# Patient Record
Sex: MALE | Race: WHITE | Employment: OTHER | URBAN - METROPOLITAN AREA
[De-identification: names, ages, dates, MRNs, and addresses within clinical notes are randomized per-mention and may not be internally consistent; named-entity substitution may affect disease eponyms.]

---

## 2017-07-17 PROBLEM — N52.9 ERECTILE DYSFUNCTION: Status: ACTIVE | Noted: 2017-07-17

## 2017-08-06 ENCOUNTER — HOSPITAL ENCOUNTER (EMERGENCY)
Age: 67
Discharge: HOME | End: 2017-08-07
Attending: EMERGENCY MEDICINE
Payer: COMMERCIAL

## 2017-08-06 ENCOUNTER — APPOINTMENT (OUTPATIENT)
Dept: GENERAL RADIOLOGY | Age: 67
End: 2017-08-06
Payer: COMMERCIAL

## 2017-08-06 VITALS
SYSTOLIC BLOOD PRESSURE: 151 MMHG | TEMPERATURE: 97.9 F | BODY MASS INDEX: 30.78 KG/M2 | RESPIRATION RATE: 16 BRPM | DIASTOLIC BLOOD PRESSURE: 84 MMHG | OXYGEN SATURATION: 96 % | WEIGHT: 215 LBS | HEART RATE: 66 BPM | HEIGHT: 70 IN

## 2017-08-06 DIAGNOSIS — S80.211A KNEE ABRASION, RIGHT, INITIAL ENCOUNTER: ICD-10-CM

## 2017-08-06 DIAGNOSIS — S40.021A CONTUSION, SHOULDER AND UPPER ARM, MULTIPLE SITES, RIGHT, INITIAL ENCOUNTER: Primary | ICD-10-CM

## 2017-08-06 DIAGNOSIS — S40.011A CONTUSION, SHOULDER AND UPPER ARM, MULTIPLE SITES, RIGHT, INITIAL ENCOUNTER: Primary | ICD-10-CM

## 2017-08-06 PROCEDURE — 73060 X-RAY EXAM OF HUMERUS: CPT

## 2017-08-06 PROCEDURE — 99283 EMERGENCY DEPT VISIT LOW MDM: CPT

## 2017-08-06 PROCEDURE — 73030 X-RAY EXAM OF SHOULDER: CPT

## 2017-08-06 ASSESSMENT — PAIN DESCRIPTION - DESCRIPTORS: DESCRIPTORS: SHARP

## 2017-08-06 ASSESSMENT — PAIN DESCRIPTION - PAIN TYPE: TYPE: ACUTE PAIN

## 2017-08-06 ASSESSMENT — PAIN DESCRIPTION - ORIENTATION: ORIENTATION: RIGHT

## 2017-08-06 ASSESSMENT — PAIN DESCRIPTION - LOCATION: LOCATION: ARM

## 2017-08-06 ASSESSMENT — PAIN SCALES - GENERAL: PAINLEVEL_OUTOF10: 7

## 2017-08-07 PROCEDURE — 90715 TDAP VACCINE 7 YRS/> IM: CPT | Performed by: EMERGENCY MEDICINE

## 2017-08-07 PROCEDURE — 6370000000 HC RX 637 (ALT 250 FOR IP): Performed by: EMERGENCY MEDICINE

## 2017-08-07 PROCEDURE — 6360000002 HC RX W HCPCS: Performed by: EMERGENCY MEDICINE

## 2017-08-07 PROCEDURE — 90471 IMMUNIZATION ADMIN: CPT | Performed by: EMERGENCY MEDICINE

## 2017-08-07 RX ORDER — ACETAMINOPHEN 325 MG/1
650 TABLET ORAL ONCE
Status: COMPLETED | OUTPATIENT
Start: 2017-08-07 | End: 2017-08-07

## 2017-08-07 RX ADMIN — ACETAMINOPHEN 650 MG: 325 TABLET ORAL at 00:46

## 2017-08-07 RX ADMIN — TETANUS TOXOID, REDUCED DIPHTHERIA TOXOID AND ACELLULAR PERTUSSIS VACCINE, ADSORBED 0.5 ML: 5; 2.5; 8; 8; 2.5 SUSPENSION INTRAMUSCULAR at 00:09

## 2017-08-07 ASSESSMENT — ENCOUNTER SYMPTOMS
NAUSEA: 0
SHORTNESS OF BREATH: 0
VOMITING: 0
SORE THROAT: 0
DIARRHEA: 0
ABDOMINAL PAIN: 0

## 2018-01-16 PROBLEM — N18.30 CKD (CHRONIC KIDNEY DISEASE), STAGE III (HCC): Status: ACTIVE | Noted: 2018-01-16

## 2019-01-15 PROBLEM — E55.9 HYPOVITAMINOSIS D: Status: ACTIVE | Noted: 2019-01-15

## 2021-03-23 LAB
ABSOLUTE BASO #: 0.1 X10E9/L (ref 0–0.2)
ABSOLUTE EOS #: 0.2 X10E9/L (ref 0–0.4)
ABSOLUTE LYMPH #: 1.9 X10E9/L (ref 1–3.5)
ABSOLUTE MONO #: 0.7 X10E9/L (ref 0–0.9)
ABSOLUTE NEUT #: 4.7 X10E9/L (ref 1.5–6.6)
ANION GAP SERPL CALCULATED.3IONS-SCNC: 9 MMOL/L (ref 5–15)
BASOPHILS RELATIVE PERCENT: 1 %
BUN BLDV-MCNC: 27 MG/DL (ref 5–27)
CALCIUM SERPL-MCNC: 9.2 MG/DL (ref 8.5–10.5)
CHLORIDE BLD-SCNC: 104 MMOL/L (ref 98–109)
CO2: 28 MMOL/L (ref 22–32)
CREAT SERPL-MCNC: 1.62 MG/DL (ref 0.6–1.3)
CREATINE, URINE: 71.44 MG/DL
EGFR AFRICAN AMERICAN: 51 ML/MIN/1.73SQ.M
EGFR IF NONAFRICAN AMERICAN: 42 ML/MIN/1.73SQ.M
EOSINOPHILS RELATIVE PERCENT: 2.2 %
GLUCOSE: 107 MG/DL (ref 65–99)
HCT VFR BLD CALC: 40.1 % (ref 39–49)
HEMOGLOBIN: 13.8 G/DL (ref 13–17)
LYMPHOCYTE %: 24.9 %
MAGNESIUM: 1.8 MG/DL (ref 1.8–2.6)
MCH RBC QN AUTO: 29.9 PG (ref 27–34)
MCHC RBC AUTO-ENTMCNC: 34.3 G/DL (ref 32–36)
MCV RBC AUTO: 87 FL (ref 80–100)
MONOCYTES # BLD: 9.4 %
NEUTROPHILS RELATIVE PERCENT: 62.5 %
PARATHYROID HORMONE INTACT: 92 PG/ML (ref 12–88)
PDW BLD-RTO: 13.8 % (ref 11.5–15)
PHOSPHORUS: 3.5 MG/DL (ref 2.4–4.9)
PLATELETS: 264 X10E9/L (ref 150–450)
PMV BLD AUTO: 7.7 FL (ref 7–12)
POTASSIUM SERPL-SCNC: 4.8 MMOL/L (ref 3.5–5)
PROTEIN, URINE: 90 MG/L
RBC: 4.6 X10E12/L (ref 4.1–5.7)
SODIUM BLD-SCNC: 141 MMOL/L (ref 134–146)
VITAMIN D 25-HYDROXY: 90.4 NG/ML (ref 30–100)
WBC: 7.5 X10E9/L (ref 4–11)

## 2021-08-26 LAB
ABSOLUTE BASO #: 0.1 X10E9/L (ref 0–0.2)
ABSOLUTE EOS #: 0.2 X10E9/L (ref 0–0.4)
ABSOLUTE LYMPH #: 2 X10E9/L (ref 1–3.5)
ABSOLUTE MONO #: 0.8 X10E9/L (ref 0–0.9)
ABSOLUTE NEUT #: 5.3 X10E9/L (ref 1.5–6.6)
ANION GAP SERPL CALCULATED.3IONS-SCNC: 7 MMOL/L (ref 5–15)
APPEARANCE: CLEAR
BASOPHILS RELATIVE PERCENT: 1.1 %
BILIRUBIN: NEGATIVE
BUN BLDV-MCNC: 37 MG/DL (ref 5–27)
CALCIUM SERPL-MCNC: 9.6 MG/DL (ref 8.5–10.5)
CHLORIDE BLD-SCNC: 103 MMOL/L (ref 98–109)
CO2: 26 MMOL/L (ref 22–32)
COLOR: YELLOW
CREAT SERPL-MCNC: 1.99 MG/DL (ref 0.6–1.3)
CREATINE, URINE: 126.94 MG/DL
EGFR AFRICAN AMERICAN: 40 ML/MIN/1.73SQ.M
EGFR IF NONAFRICAN AMERICAN: 33 ML/MIN/1.73SQ.M
EOSINOPHILS RELATIVE PERCENT: 2.7 %
GLUCOSE BLD-MCNC: NEGATIVE MG/DL
GLUCOSE: 97 MG/DL (ref 65–99)
HCT VFR BLD CALC: 41.6 % (ref 39–49)
HEMOGLOBIN: 14.1 G/DL (ref 13–17)
KETONES, URINE: NEGATIVE MG/DL
LEUKOCYTE ESTERASE, URINE: NEGATIVE
LYMPHOCYTE %: 24 %
MAGNESIUM: 1.9 MG/DL (ref 1.8–2.6)
MCH RBC QN AUTO: 30.1 PG (ref 27–34)
MCHC RBC AUTO-ENTMCNC: 33.8 G/DL (ref 32–36)
MCV RBC AUTO: 89 FL (ref 80–100)
MONOCYTES # BLD: 9.8 %
NEUTROPHILS RELATIVE PERCENT: 62.4 %
NITRITE, URINE: NEGATIVE
OCCULT BLOOD,URINE: NEGATIVE
PARATHYROID HORMONE INTACT: 90 PG/ML (ref 12–88)
PDW BLD-RTO: 14.3 % (ref 11.5–15)
PH: 6 (ref 5–8.5)
PHOSPHORUS: 3.6 MG/DL (ref 2.4–4.9)
PLATELETS: 302 X10E9/L (ref 150–450)
PMV BLD AUTO: 7.3 FL (ref 7–12)
POTASSIUM SERPL-SCNC: 5 MMOL/L (ref 3.5–5)
PROTEIN, URINE: 80 MG/L
PROTEIN, URINE: NEGATIVE MG/DL
RBC: 4.68 X10E12/L (ref 4.1–5.7)
SODIUM BLD-SCNC: 136 MMOL/L (ref 134–146)
SP GRAVITY MISCELLANEOUS: 1.01 (ref 1–1.03)
UROBILINOGEN, URINE: <1.1 EU/DL
VITAMIN D 25-HYDROXY: 64.3 NG/ML (ref 30–100)
WBC: 8.4 X10E9/L (ref 4–11)

## 2021-11-25 ENCOUNTER — APPOINTMENT (OUTPATIENT)
Dept: CT IMAGING | Age: 71
End: 2021-11-25
Payer: MEDICARE

## 2021-11-25 ENCOUNTER — HOSPITAL ENCOUNTER (EMERGENCY)
Age: 71
Discharge: HOME | End: 2021-11-25
Attending: EMERGENCY MEDICINE
Payer: MEDICARE

## 2021-11-25 VITALS
OXYGEN SATURATION: 95 % | RESPIRATION RATE: 17 BRPM | TEMPERATURE: 97.5 F | WEIGHT: 215 LBS | DIASTOLIC BLOOD PRESSURE: 107 MMHG | HEIGHT: 70 IN | HEART RATE: 87 BPM | SYSTOLIC BLOOD PRESSURE: 168 MMHG | BODY MASS INDEX: 30.78 KG/M2

## 2021-11-25 DIAGNOSIS — R11.0 NAUSEA: ICD-10-CM

## 2021-11-25 DIAGNOSIS — R53.83 FATIGUE, UNSPECIFIED TYPE: Primary | ICD-10-CM

## 2021-11-25 LAB
ABSOLUTE EOS #: 0.1 K/UL (ref 0–0.4)
ABSOLUTE IMMATURE GRANULOCYTE: ABNORMAL K/UL (ref 0–0.3)
ABSOLUTE LYMPH #: 1.2 K/UL (ref 1–4.8)
ABSOLUTE MONO #: 0.7 K/UL (ref 0.1–1.2)
ALBUMIN SERPL-MCNC: 4.1 G/DL (ref 3.5–5.2)
ALBUMIN/GLOBULIN RATIO: 1.4 (ref 1–2.5)
ALP BLD-CCNC: 76 U/L (ref 40–129)
ALT SERPL-CCNC: 23 U/L (ref 5–41)
ANION GAP SERPL CALCULATED.3IONS-SCNC: 11 MMOL/L (ref 9–17)
AST SERPL-CCNC: 20 U/L
BASOPHILS # BLD: 1 % (ref 0–2)
BASOPHILS ABSOLUTE: 0.1 K/UL (ref 0–0.2)
BILIRUB SERPL-MCNC: 0.64 MG/DL (ref 0.3–1.2)
BILIRUBIN URINE: NEGATIVE
BUN BLDV-MCNC: 16 MG/DL (ref 8–23)
BUN/CREAT BLD: ABNORMAL (ref 9–20)
CALCIUM SERPL-MCNC: 9.7 MG/DL (ref 8.6–10.4)
CHLORIDE BLD-SCNC: 98 MMOL/L (ref 98–107)
CO2: 26 MMOL/L (ref 20–31)
COLOR: YELLOW
COMMENT UA: ABNORMAL
CREAT SERPL-MCNC: 1.41 MG/DL (ref 0.7–1.2)
DIFFERENTIAL TYPE: ABNORMAL
EOSINOPHILS RELATIVE PERCENT: 1 % (ref 1–4)
GFR AFRICAN AMERICAN: >60 ML/MIN
GFR NON-AFRICAN AMERICAN: 50 ML/MIN
GFR SERPL CREATININE-BSD FRML MDRD: ABNORMAL ML/MIN/{1.73_M2}
GFR SERPL CREATININE-BSD FRML MDRD: ABNORMAL ML/MIN/{1.73_M2}
GLUCOSE BLD-MCNC: 108 MG/DL (ref 70–99)
GLUCOSE URINE: NEGATIVE
HCT VFR BLD CALC: 40.1 % (ref 41–53)
HEMOGLOBIN: 13.8 G/DL (ref 13.5–17.5)
IMMATURE GRANULOCYTES: ABNORMAL %
KETONES, URINE: ABNORMAL
LACTIC ACID: 1.1 MMOL/L (ref 0.5–2.2)
LEUKOCYTE ESTERASE, URINE: NEGATIVE
LIPASE: 48 U/L (ref 13–60)
LYMPHOCYTES # BLD: 14 % (ref 24–44)
MCH RBC QN AUTO: 30.7 PG (ref 26–34)
MCHC RBC AUTO-ENTMCNC: 34.5 G/DL (ref 31–37)
MCV RBC AUTO: 89.1 FL (ref 80–100)
MONOCYTES # BLD: 8 % (ref 2–11)
NITRITE, URINE: NEGATIVE
NRBC AUTOMATED: ABNORMAL PER 100 WBC
PDW BLD-RTO: 13.7 % (ref 12.5–15.4)
PH UA: 7.5 (ref 5–8)
PLATELET # BLD: 316 K/UL (ref 140–450)
PLATELET ESTIMATE: ABNORMAL
PMV BLD AUTO: 7 FL (ref 6–12)
POTASSIUM SERPL-SCNC: 4.4 MMOL/L (ref 3.7–5.3)
PROTEIN UA: NEGATIVE
RBC # BLD: 4.51 M/UL (ref 4.5–5.9)
RBC # BLD: ABNORMAL 10*6/UL
SEG NEUTROPHILS: 76 % (ref 36–66)
SEGMENTED NEUTROPHILS ABSOLUTE COUNT: 6.5 K/UL (ref 1.8–7.7)
SODIUM BLD-SCNC: 135 MMOL/L (ref 135–144)
SPECIFIC GRAVITY UA: 1.01 (ref 1–1.03)
TOTAL PROTEIN: 7.1 G/DL (ref 6.4–8.3)
TROPONIN INTERP: NORMAL
TROPONIN INTERP: NORMAL
TROPONIN T: NORMAL NG/ML
TROPONIN T: NORMAL NG/ML
TROPONIN, HIGH SENSITIVITY: 16 NG/L (ref 0–22)
TROPONIN, HIGH SENSITIVITY: 17 NG/L (ref 0–22)
TURBIDITY: CLEAR
URINE HGB: NEGATIVE
UROBILINOGEN, URINE: NORMAL
WBC # BLD: 8.6 K/UL (ref 3.5–11)
WBC # BLD: ABNORMAL 10*3/UL

## 2021-11-25 PROCEDURE — 6360000002 HC RX W HCPCS: Performed by: PHYSICIAN ASSISTANT

## 2021-11-25 PROCEDURE — 99285 EMERGENCY DEPT VISIT HI MDM: CPT

## 2021-11-25 PROCEDURE — 81003 URINALYSIS AUTO W/O SCOPE: CPT

## 2021-11-25 PROCEDURE — 85025 COMPLETE CBC W/AUTO DIFF WBC: CPT

## 2021-11-25 PROCEDURE — 83605 ASSAY OF LACTIC ACID: CPT

## 2021-11-25 PROCEDURE — 2580000003 HC RX 258: Performed by: EMERGENCY MEDICINE

## 2021-11-25 PROCEDURE — U0003 INFECTIOUS AGENT DETECTION BY NUCLEIC ACID (DNA OR RNA); SEVERE ACUTE RESPIRATORY SYNDROME CORONAVIRUS 2 (SARS-COV-2) (CORONAVIRUS DISEASE [COVID-19]), AMPLIFIED PROBE TECHNIQUE, MAKING USE OF HIGH THROUGHPUT TECHNOLOGIES AS DESCRIBED BY CMS-2020-01-R: HCPCS

## 2021-11-25 PROCEDURE — 36415 COLL VENOUS BLD VENIPUNCTURE: CPT

## 2021-11-25 PROCEDURE — 96374 THER/PROPH/DIAG INJ IV PUSH: CPT

## 2021-11-25 PROCEDURE — 2580000003 HC RX 258: Performed by: PHYSICIAN ASSISTANT

## 2021-11-25 PROCEDURE — 80053 COMPREHEN METABOLIC PANEL: CPT

## 2021-11-25 PROCEDURE — 96361 HYDRATE IV INFUSION ADD-ON: CPT

## 2021-11-25 PROCEDURE — 6360000004 HC RX CONTRAST MEDICATION: Performed by: EMERGENCY MEDICINE

## 2021-11-25 PROCEDURE — 83690 ASSAY OF LIPASE: CPT

## 2021-11-25 PROCEDURE — 74177 CT ABD & PELVIS W/CONTRAST: CPT

## 2021-11-25 PROCEDURE — 93005 ELECTROCARDIOGRAM TRACING: CPT | Performed by: PHYSICIAN ASSISTANT

## 2021-11-25 PROCEDURE — 84484 ASSAY OF TROPONIN QUANT: CPT

## 2021-11-25 PROCEDURE — U0005 INFEC AGEN DETEC AMPLI PROBE: HCPCS

## 2021-11-25 PROCEDURE — 6370000000 HC RX 637 (ALT 250 FOR IP): Performed by: PHYSICIAN ASSISTANT

## 2021-11-25 RX ORDER — SODIUM CHLORIDE 0.9 % (FLUSH) 0.9 %
10 SYRINGE (ML) INJECTION PRN
Status: DISCONTINUED | OUTPATIENT
Start: 2021-11-25 | End: 2021-11-25 | Stop reason: HOSPADM

## 2021-11-25 RX ORDER — ONDANSETRON 4 MG/1
4 TABLET, ORALLY DISINTEGRATING ORAL EVERY 8 HOURS PRN
Qty: 20 TABLET | Refills: 0 | Status: SHIPPED | OUTPATIENT
Start: 2021-11-25 | End: 2022-05-24 | Stop reason: ALTCHOICE

## 2021-11-25 RX ORDER — 0.9 % SODIUM CHLORIDE 0.9 %
80 INTRAVENOUS SOLUTION INTRAVENOUS ONCE
Status: COMPLETED | OUTPATIENT
Start: 2021-11-25 | End: 2021-11-25

## 2021-11-25 RX ORDER — 0.9 % SODIUM CHLORIDE 0.9 %
1000 INTRAVENOUS SOLUTION INTRAVENOUS ONCE
Status: COMPLETED | OUTPATIENT
Start: 2021-11-25 | End: 2021-11-25

## 2021-11-25 RX ORDER — ONDANSETRON 2 MG/ML
4 INJECTION INTRAMUSCULAR; INTRAVENOUS ONCE
Status: COMPLETED | OUTPATIENT
Start: 2021-11-25 | End: 2021-11-25

## 2021-11-25 RX ORDER — ONDANSETRON 4 MG/1
4 TABLET, ORALLY DISINTEGRATING ORAL ONCE
Status: COMPLETED | OUTPATIENT
Start: 2021-11-25 | End: 2021-11-25

## 2021-11-25 RX ADMIN — SODIUM CHLORIDE 80 ML: 9 INJECTION, SOLUTION INTRAVENOUS at 18:32

## 2021-11-25 RX ADMIN — SODIUM CHLORIDE, PRESERVATIVE FREE 10 ML: 5 INJECTION INTRAVENOUS at 18:33

## 2021-11-25 RX ADMIN — IOPAMIDOL 75 ML: 755 INJECTION, SOLUTION INTRAVENOUS at 18:32

## 2021-11-25 RX ADMIN — ONDANSETRON 4 MG: 4 TABLET, ORALLY DISINTEGRATING ORAL at 21:41

## 2021-11-25 RX ADMIN — ONDANSETRON 4 MG: 2 INJECTION INTRAMUSCULAR; INTRAVENOUS at 18:08

## 2021-11-25 RX ADMIN — SODIUM CHLORIDE 1000 ML: 9 INJECTION, SOLUTION INTRAVENOUS at 18:08

## 2021-11-25 NOTE — ED PROVIDER NOTES
[id number] [facility] ED  [address] [facility] [address]  Phone: [phone]  Fax: [phone]      Attending Physician [address]       Chief Complaint   Patient presents with    Nausea       DIAGNOSTIC RESULTS     LABS:  Labs Reviewed   CBC WITH AUTO DIFFERENTIAL - Abnormal; Notable for the following components:       Result Value    Hematocrit 40.1 (*)     Seg Neutrophils 76 (*)     Lymphocytes 14 (*)     All other components within normal limits   COMPREHENSIVE METABOLIC PANEL W/ REFLEX TO MG FOR LOW K - Abnormal; Notable for the following components:    Glucose 108 (*)     CREATININE 1.41 (*)     GFR Non- 50 (*)     All other components within normal limits   LIPASE   TROPONIN   LACTIC ACID   URINE RT REFLEX TO CULTURE       All other labs were within normal range or not returned as of this dictation. RADIOLOGY:  CT ABDOMEN PELVIS W IV CONTRAST Additional Contrast? None   Final Result   No acute inflammatory process or bowel obstruction. Colonic diverticulosis. Cholelithiasis. Postsurgical versus post posttraumatic changes right kidney noted with   surgical clips. Please correlate with surgical history. EMERGENCY DEPARTMENT COURSE:   Vitals:    Vitals:    11/25/21 1715   BP: (!) 168/107   Pulse: 87   Resp: 17   Temp: 97.5 °F (36.4 °C)   TempSrc: Oral   SpO2: 95%   Weight: 97.5 kg (215 lb)   Height: 5' 10\" (1.778 m)     -------------------------  BP: (!) 168/107, Temp: 97.5 °F (36.4 °C), Pulse: 87, Resp: 17      ED Course as of 11/25/21 1920   Thu Nov 25, 2021   1828 CBC is unremarkable. CMP demonstrates elevated creatinine at 1.41 which is improved from previous at 1.99 3 months ago, but is otherwise unremarkable. Lipase is normal.  Lactic acid is normal.  Troponin is normal. [MG]   1918 CT scan does not demonstrate any acute abnormalities.  [MG]      ED Course User Index  [MG] Oracio Meyers PA-C PERTINENT ATTENDING PHYSICIAN COMMENTS:    I performed a history and physical examination of the patient and discussed management with the mid level provider. I reviewed the mid level provider's note and agree with the documented findings and plan of care. Any areas of disagreement are noted on the chart. I was personally present for the key portions of any procedures. I have documented in the chart those procedures where I was not present during the key portions. I have reviewed the emergency nurses triage note. I agree with the chief complaint, past medical history, past surgical history, allergies, medications, social and family history as documented unless otherwise noted below. Documentation of the HPI, Physical Exam and Medical Decision Making performed by mid level providers is based on my personal performance of the HPI, PE and MDM. For Physician Assistant/ Nurse Practitioner cases/documentation I have personally evaluated this patient and have completed at least one if not all key elements of the E/M (history, physical exam, and MDM). Additional findings are as noted. 71-year-old male here with nausea and fatigue. No upper respiratory type symptoms. No diarrhea. On physical exam, he does have a little tenderness in the left lower quadrant.     EKG Interpretation    Interpreted by emergency department physician    Rhythm: normal sinus   Rate: normal  Axis: normal  Ectopy: none  Conduction: normal  ST Segments: normal  T Waves: inversion in  aVl  Q Waves: none    Clinical Impression: non-specific EKG    Natalia Suresh MD        (Please note that portions of this note were completed with a voice recognition program.  Efforts were made to edit the dictations but occasionally words are mis-transcribed.)    Meryl Ortiz MD  Attending Emergency Medicine Physician        Natalia Suresh MD  11/25/21 Vero Nguyen

## 2021-11-26 ENCOUNTER — CARE COORDINATION (OUTPATIENT)
Dept: CARE COORDINATION | Age: 71
End: 2021-11-26

## 2021-11-26 LAB
EKG ATRIAL RATE: 69 BPM
EKG P AXIS: 8 DEGREES
EKG P-R INTERVAL: 160 MS
EKG Q-T INTERVAL: 410 MS
EKG QRS DURATION: 90 MS
EKG QTC CALCULATION (BAZETT): 439 MS
EKG R AXIS: 24 DEGREES
EKG T AXIS: 87 DEGREES
EKG VENTRICULAR RATE: 69 BPM

## 2021-11-26 NOTE — ED PROVIDER NOTES
[address] [facility] ED  [address] [facility] [address]  Phone: [phone]  Fax: [phone]        Pt Name: Kate Barbour  MRN: [id number]  [place] 1950  Date of evaluation: 11/25/21    [address] [place] [address]       Chief Complaint   Patient presents with    Nausea       HISTORY OF PRESENT ILLNESS (Location/Symptom, Timing/Onset, Context/Setting, Quality, Duration, Modifying Factors, Severity)      Kate Barbour is a 80 y.o. male with pertinent PMH CKD, HTN, HLD, repaired AAA who presents to the ED via private auto with nausea and fatigue. Patient reports that for the last couple of days he has noticed that he has felt nauseated and does not feel like eating. His wife also noticed that he seems more fatigued than usual. She notes that they do not go anywhere, however he did go out to breakfast with his friends and she did not go, and did consider that maybe he is Covid. He is not experiencing any symptoms other than the nausea and generalized fatigue. He is able to form his ADLs and ambulate. There is no lightheadedness or dizziness. No exacerbating or alleviating factors. Denies taking any medication for symptoms. Denies any fever, chills, headache, vision changes, URI symptoms, cough, congestion, vomiting, diarrhea, abdominal pain, urinary symptoms, or any other concerns at this time. PAST MEDICAL / SURGICAL / SOCIAL / FAMILY HISTORY     PMH:  has a past medical history of Anxiety, Aortic aneurysm ([address] Arthritis, Asthma, Basal cell carcinoma, Caffeine use, Cataract, Chronic back pain, Chronic kidney disease, Emphysema of lung ([address] Hiatal hernia, Hyperlipidemia, and Hypertension. Surgical History:  has a past surgical history that includes Colonoscopy; Vasectomy; partial nephrectomy; Abdominal aortic aneurysm repair; and Cataract removal.  Social History:  reports that he quit smoking about 35 years ago. He smoked 1.00 pack per day.  He has never used smokeless tobacco. He reports current alcohol use. He reports that he does not use drugs. Family History: He indicated that his mother is . He indicated that his father is . family history includes Cancer in his father; Diabetes in his father. Psychiatric History: None    Allergies: Pcn [penicillins]    Home Medications:   Prior to Admission medications    Medication Sig Start Date End Date Taking? Authorizing Provider   ondansetron (ZOFRAN ODT) 4 MG disintegrating tablet Take 1 tablet by mouth every 8 hours as needed for Nausea 21  Yes Shona Raphael PA-C   HYDROcodone-acetaminophen (NORCO) 5-325 MG per tablet Take 1 tablet by mouth every 6 hours as needed for Pain. Historical Provider, MD   tamsulosin (FLOMAX) 0.4 MG capsule Take 1 capsule by mouth daily 21   Jennifer Faulkner MD   finasteride (PROSCAR) 5 MG tablet Take 1 tablet by mouth daily 21   Jennifer Faulkner MD   Pseudophed-Chlophedianol-GG 30-12.5-100 MG/5ML LIQD Take 30 mg by mouth as needed    Historical Provider, MD   lisinopril (PRINIVIL;ZESTRIL) 10 MG tablet Take 1 tablet by mouth daily 21   Warren Nicolas MD   furosemide (LASIX) 20 MG tablet Take 1 tablet by mouth daily  Patient taking differently: Take 10 mg by mouth daily  12/3/19   Warren Nicolas MD   KRILL OIL PO Take by mouth    Historical Provider, MD   vitamin D (CHOLECALCIFEROL) 1000 UNIT TABS tablet Take 1,000 Units by mouth daily    Historical Provider, MD   allopurinol (ZYLOPRIM) 100 MG tablet Take 100 mg by mouth daily  16   Historical Provider, MD   SYMBICORT 80-4.5 MCG/ACT AERO 2 puffs 2 times daily  16   Historical Provider, MD   Multiple Vitamins-Minerals (MULTIVITAMIN ADULT) TABS Take by mouth daily     Historical Provider, MD   ezetimibe-simvastatin (VYTORIN) 10-20 MG per tablet Take 1 tablet by mouth nightly    Historical Provider, MD   LORazepam (ATIVAN) 1 MG tablet Take 1 mg by mouth daily.     Historical Provider, MD   omeprazole (PRILOSEC) 20 MG capsule Take 20 mg by mouth daily. Historical Provider, MD   metoprolol (TOPROL-XL) 50 MG XL tablet Take 50 mg by mouth 2 times daily. Historical Provider, MD   b complex vitamins capsule Take 1 capsule by mouth daily     Historical Provider, MD   warfarin (COUMADIN) 5 MG tablet Take 5 mg by mouth daily. Indications: 1 1/2 tabs daily    Historical Provider, MD       REVIEW OF SYSTEMS  (2-9 systems for level 4, 10 ormore for level 5)      Review of Systems    Constitutional: Denies fever or chills. Eyes: Denies vision changes. HENT: Denies sore throat or neck pain. Respiratory: Denies cough or shortness of breath. Cardiovascular: Denies chest pain. GI: Denies vomiting or diarrhea. : Denies painful urination. Musculoskeletal: Denies recent trauma. Skin: Denies new rashes or wounds. Neurologic:  Positive for fatigue. Denies new numbness or weakness. Psychiatric: Denies sleep disturbances. Heme: Denies bleeding disorders. All other systems negative except as marked. PHYSICAL EXAM  (up to 7 for level 4, 8 or more for level 5)      INITIAL VITALS:  height is 5' 10\" (1.778 m) and weight is 97.5 kg (215 lb). His oral temperature is 97.5 °F (36.4 °C). His blood pressure is 168/107 (abnormal) and his pulse is 87. His respiration is 17 and oxygen saturation is 95%. Vital signs reviewed. Physical Exam    General:  Alert, cooperative, well-groomed, well-nourished, appears stated age, and is in no acute distress. Head:  Normocephalic, atraumatic, and without obvious abnormality. Eyes:  Sclerae/conjunctivae clear without injection, pallor, or icterus. Corneas clear without opacities. EOM's intact. ENT: Ears and nose are all without obvious masses lesion or deformity. No oropharynx examination performed due to aerosolization risk during COVID-19 pandemic. Neck: Supple and symmetrical. Trachea midline. No adenopathy. No jugular venous distention. Lungs:   No respiratory distress. Clear to auscultation bilaterally. No wheezes, rhonchi, or rales. Heart:  Regular rate. Regular rhythm. No murmurs, rubs, or gallops. Abdomen:   Normoactive bowel sounds. Soft, nontender, nondistended without guarding or rebound. No palpable masses. No CVA tenderness. Extremities: Warm and dry without erythema or edema. Skin: Soft, good turgor, and well-hydrated. No obvious rashes or lesions. Neurologic: GCS is 15 and no focal deficits are appreciated. Normal gait. Grossly normal motor and sensation. Speech clear. Psychiatric: Normal mood and affect. Normal behavior. Coherent thought process. DIFFERENTIAL DIAGNOSIS / MDM     Patient presents emergency department for the complaints as described above. Vital signs demonstrate hypertension, but are otherwise unremarkable. Physical exam demonstrates a well-appearing nontoxic male no acute distress. Visually does not appear fatigued. His lungs are clear to auscultation. Heart rate and rhythm are regular. Abdomen is soft, but he did have some slight tenderness in the left lower quadrant on palpation. No peritoneal signs. Due to his vague complaints, will obtain labs, EKG, and CT scan of the abdomen. We'll get some IVF and Zofran. Pending a normal work-up, we also discussed sleep apnea and acid reflux possibly contributing to poor sleep and daytime fatigue as well as nausea. This could be considered but will r/o other cardiac/abdominal or infectious causes here.        PLAN (LABS / IMAGING / EKG):  Orders Placed This Encounter   Procedures    CT ABDOMEN PELVIS W IV CONTRAST Additional Contrast? None    CBC Auto Differential    Comprehensive Metabolic Panel w/ Reflex to MG    Lipase    Troponin    Urinalysis Reflex to Culture    Lactic Acid    Troponin    COVID-19    EKG 12 Lead    Insert peripheral IV       MEDICATIONS ORDERED:  Orders Placed This Encounter   Medications    ondansetron (ZOFRAN) injection 4 mg    0.9 % sodium chloride bolus    iopamidol (ISOVUE-370) 76 % injection 75 mL    DISCONTD: sodium chloride flush 0.9 % injection 10 mL    0.9 % sodium chloride bolus    ondansetron (ZOFRAN ODT) 4 MG disintegrating tablet     Sig: Take 1 tablet by mouth every 8 hours as needed for Nausea     Dispense:  20 tablet     Refill:  0    ondansetron (ZOFRAN-ODT) disintegrating tablet 4 mg       Controlled Substances Monitoring:     DIAGNOSTIC RESULTS     EKG: All EKG's are interpreted by the Emergency Department Physician who either signs or Co-signs this chart in the absenceof a cardiologist.    Interpreted by emergency department physician     Rhythm: normal sinus   Rate: normal  Axis: normal  Ectopy: none  Conduction: normal  ST Segments: normal  T Waves: inversion in  aVl  Q Waves: none     Clinical Impression: non-specific EKG    RADIOLOGY: All images are read by the radiologist and their interpretations are reviewed. CT ABDOMEN PELVIS W IV CONTRAST Additional Contrast? None    Result Date: 11/25/2021  EXAMINATION: CT OF THE ABDOMEN AND PELVIS WITH CONTRAST 11/25/2021 6:25 pm TECHNIQUE: CT of the abdomen and pelvis was performed with the administration of intravenous contrast. Multiplanar reformatted images are provided for review. Dose modulation, iterative reconstruction, and/or weight based adjustment of the mA/kV was utilized to reduce the radiation dose to as low as reasonably achievable. COMPARISON: None. HISTORY: ORDERING SYSTEM PROVIDED HISTORY: nausea, left sided abdominal pain TECHNOLOGIST PROVIDED HISTORY: nausea, left sided abdominal pain Decision Support Exception - unselect if not a suspected or confirmed emergency medical condition->Emergency Medical Condition (MA) Reason for Exam: Left sided abdominal pain, nausea Acuity: Acute Type of Exam: Initial FINDINGS: Lower Chest: Coronary disease and post CABG changes partially visualized. Organs: Cholelithiasis. Fatty infiltration liver.   Otherwise spleen, adrenal glands left kidney unremarkable. Left renal cyst noted. No hydronephrosis. Atrophic right kidney with loculated collection 2.8 cm in size with adjacent areas of surgical clips could be related to previous surgery or posttraumatic changes. GI/Bowel: Mild retained stool throughout colon. No bowel obstruction. Colonic diverticulosis. Appendix unremarkable. Pelvis: Bladder and prostate unremarkable. Peritoneum/Retroperitoneum: Aortic vascular calcifications. Aorta is nonaneurysmal. Bones/Soft Tissues: Multilevel degenerate changes of the lumbar spine. Levocurvature noted. Degenerate changes of the hips and pelvis. Small fat containing umbilical hernia     No acute inflammatory process or bowel obstruction. Colonic diverticulosis. Cholelithiasis. Postsurgical versus post posttraumatic changes right kidney noted with surgical clips. Please correlate with surgical history.        LABS:  Results for orders placed or performed during the hospital encounter of 11/25/21   CBC Auto Differential   Result Value Ref Range    WBC 8.6 3.5 - 11.0 k/uL    RBC 4.51 4.5 - 5.9 m/uL    Hemoglobin 13.8 13.5 - 17.5 g/dL    Hematocrit 40.1 (L) 41 - 53 %    MCV 89.1 80 - 100 fL    MCH 30.7 26 - 34 pg    MCHC 34.5 31 - 37 g/dL    RDW 13.7 12.5 - 15.4 %    Platelets 903 073 - 253 k/uL    MPV 7.0 6.0 - 12.0 fL    NRBC Automated NOT REPORTED per 100 WBC    Differential Type NOT REPORTED     Seg Neutrophils 76 (H) 36 - 66 %    Lymphocytes 14 (L) 24 - 44 %    Monocytes 8 2 - 11 %    Eosinophils % 1 1 - 4 %    Basophils 1 0 - 2 %    Immature Granulocytes NOT REPORTED 0 %    Segs Absolute 6.50 1.8 - 7.7 k/uL    Absolute Lymph # 1.20 1.0 - 4.8 k/uL    Absolute Mono # 0.70 0.1 - 1.2 k/uL    Absolute Eos # 0.10 0.0 - 0.4 k/uL    Basophils Absolute 0.10 0.0 - 0.2 k/uL    Absolute Immature Granulocyte NOT REPORTED 0.00 - 0.30 k/uL    WBC Morphology NOT REPORTED     RBC Morphology NOT REPORTED     Platelet Estimate NOT REPORTED    Comprehensive Metabolic Panel w/ Reflex to MG   Result Value Ref Range    Glucose 108 (H) 70 - 99 mg/dL    BUN 16 8 - 23 mg/dL    CREATININE 1.41 (H) 0.70 - 1.20 mg/dL    Bun/Cre Ratio NOT REPORTED 9 - 20    Calcium 9.7 8.6 - 10.4 mg/dL    Sodium 135 135 - 144 mmol/L    Potassium 4.4 3.7 - 5.3 mmol/L    Chloride 98 98 - 107 mmol/L    CO2 26 20 - 31 mmol/L    Anion Gap 11 9 - 17 mmol/L    Alkaline Phosphatase 76 40 - 129 U/L    ALT 23 5 - 41 U/L    AST 20 <40 U/L    Total Bilirubin 0.64 0.3 - 1.2 mg/dL    Total Protein 7.1 6.4 - 8.3 g/dL    Albumin 4.1 3.5 - 5.2 g/dL    Albumin/Globulin Ratio 1.4 1.0 - 2.5    GFR Non- 50 (L) >60 mL/min    GFR African American >60 >60 mL/min    GFR Comment          GFR Staging NOT REPORTED    Lipase   Result Value Ref Range    Lipase 48 13 - 60 U/L   Troponin   Result Value Ref Range    Troponin, High Sensitivity 17 0 - 22 ng/L    Troponin T NOT REPORTED <0.03 ng/mL    Troponin Interp NOT REPORTED    Urinalysis Reflex to Culture    Specimen: Urine, clean catch   Result Value Ref Range    Color, UA Yellow Yellow    Turbidity UA Clear Clear    Glucose, Ur NEGATIVE NEGATIVE    Bilirubin Urine NEGATIVE NEGATIVE    Ketones, Urine TRACE (A) NEGATIVE    Specific Gravity, UA 1.015 1.005 - 1.030    Urine Hgb NEGATIVE NEGATIVE    pH, UA 7.5 5.0 - 8.0    Protein, UA NEGATIVE NEGATIVE    Urobilinogen, Urine Normal Normal    Nitrite, Urine NEGATIVE NEGATIVE    Leukocyte Esterase, Urine NEGATIVE NEGATIVE    Urinalysis Comments       Microscopic exam not performed based on chemical results unless requested in original order. Urinalysis Comments          Urinalysis Comments       Utilizing a urinalysis as the only screening method to exclude a potential uropathogen can be unreliable in many patient populations. Rapid screening tests are less sensitive than culture and if UTI is a clinical possibility, culture should be considered despite a negative urinalysis.    Lactic Acid   Result Value Ref Range    Lactic Acid 1.1 0.5 - 2.2 mmol/L   Troponin   Result Value Ref Range    Troponin, High Sensitivity 16 0 - 22 ng/L    Troponin T NOT REPORTED <0.03 ng/mL    Troponin Interp NOT REPORTED    COVID-19    Specimen: Nasopharyngeal Swab   Result Value Ref Range    SARS-CoV-2          Source . NASOPHARYNGEAL SWAB     SARS-CoV-2 Not Detected Not Detected   EKG 12 Lead   Result Value Ref Range    Ventricular Rate 69 BPM    Atrial Rate 69 BPM    P-R Interval 160 ms    QRS Duration 90 ms    Q-T Interval 410 ms    QTc Calculation (Bazett) 439 ms    P Axis 8 degrees    R Axis 24 degrees    T Axis 87 degrees       EMERGENCY DEPARTMENT COURSE     ED Course as of 12/03/21 0021   Thu Nov 25, 2021   1828 CBC is unremarkable. CMP demonstrates elevated creatinine at 1.41 which is improved from previous at 1.99 3 months ago, but is otherwise unremarkable. Lipase is normal.  Lactic acid is normal.  Troponin is normal. [MG]   1918 CT scan does not demonstrate any acute abnormalities. Patient updated regarding results. Patient's wife notes that her biggest concern is that he has just been very fatigued. Patient denies any lightheadedness. We discussed any recent sick contacts and will test him for COVID-19 as well. They are vaccinated and boosted. [MG]   2007 UA is unremarkable. [MG]   2041 Repeat troponin is normal. Patient and wife updated regarding results and advise follow-up with PCP early next week for recheck if symptoms are not improving. Advised to return to the ED if symptoms worsen or he begins experiencing new symptoms like chest pain, lightheadedness, diarrhea, abdominal pain, fever, or any other concerning symptoms and they verbalized understanding.  [MG]      ED Course User Index  [MG] Shona Raphael PA-C        Vitals:    Vitals:    11/25/21 1715   BP: (!) 168/107   Pulse: 87   Resp: 17   Temp: 97.5 °F (36.4 °C)   TempSrc: Oral   SpO2: 95%   Weight: 97.5 kg (215 lb)   Height: 5' 10\" (1.778 m)     -------------------------  BP: (!) 168/107, Temp: 97.5 °F (36.4 °C), Pulse: 87, Resp: 17      RE-EVALUATION:  See ED Course notes above. Discharge home with Zofran. The patient and/or family and I have discussed the diagnosis and risks, and we agree with discharging home to follow-up with their pertinent providers. The patient appears stable for discharge and has been instructed to return immediately for new concerning symptoms or if the symptoms worsen in any way. The patient understands that at this time there is no evidence for a more malignant underlying process, but the patient also understands that early in the process of an illness or injury, an emergency department workup can be falsely reassuring. Routine discharge counseling was given, and the patient understands that worsening, changing or persistent symptoms should prompt an immediate call or follow up with their primary physician or return to the emergency department. I have reviewed the disposition diagnosis with the patient and or their family/guardian. I have answered their questions and given discharge instructions. They voiced understanding of these instructions and did not have any further questions or complaints. This patient was seen by the attending physician and they agreed with the assessment and plan. CONSULTS:  None    PROCEDURES:  None    FINAL IMPRESSION      1. Fatigue, unspecified type    2. Nausea          DISPOSITION / PLAN     CONDITION ON DISPOSITION:   Good / Stable for discharge.      PATIENT REFERRED TO:  Rick Jaime MD  [address] [address] [phone]    Call in 4 days  To schedule point for follow-up      DISCHARGE MEDICATIONS:  Discharge Medication List as of 11/25/2021  9:11 PM      START taking these medications    Details   ondansetron (ZOFRAN ODT) 4 MG disintegrating tablet Take 1 tablet by mouth every 8 hours as needed for Nausea, Disp-20 tablet, R-0Normal             Shona Raphael PA-C Emergency Medicine Physician Assistant    (Please note that portions of this note were completed with a voice recognition program.  Efforts were made to edit the dictations but occasionally words aremis-transcribed.)        Amanda Martinez PA-C  12/03/21 0028

## 2021-11-26 NOTE — CARE COORDINATION
Patient contacted regarding COVID-19 exposure. Discussed COVID-19 related testing which was pending at this time. Test results were pending. Patient informed of results, if available? No.      Ambulatory Care Manager contacted the patient by telephone to perform post discharge assessment. Call within 2 business days of discharge: Yes. Verified name and  with patient as identifiers. Provided introduction to self, and explanation of the CTN/ACM role, and reason for call due to risk factors for infection and/or exposure to COVID-19. Symptoms reviewed with patient who verbalized the following symptoms: fatigue and nausea. Due to no new or worsening symptoms encounter was not routed to provider for escalation. Discussed follow-up appointments. If no appointment was previously scheduled, appointment scheduling offered: No.  [address] follow up appointment(s):   Future Appointments   Date Time Provider Isrrael Hanson   2022  2:50 PM Andrea Murillo MD [facility] None     Non-[facility] follow up appointment(s):     Non-face-to-face services provided:  Obtained and reviewed discharge summary and/or continuity of care documents     Advance Care Planning:   Does patient have an Advance Directive:  reviewed and needs to be updated. Educated patient about risk for severe COVID-19 due to risk factors according to CDC guidelines. ACM reviewed discharge instructions, medical action plan and red flag symptoms with the patient who verbalized understanding. Discussed COVID vaccination status: Yes. Education provided on COVID-19 vaccination as appropriate. Discussed exposure protocols and quarantine with CDC Guidelines. Patient was given an opportunity to verbalize any questions and concerns and agrees to contact ACM or health care provider for questions related to their healthcare.     Reviewed and educated patient on any new and changed medications related to discharge diagnosis     Was patient discharged with a pulse oximeter? No Discussed and confirmed pulse oximeter discharge instructions and when to notify provider or seek emergency care. ACM provided contact information. Plan for follow-up call in 3-5 days based on severity of symptoms and risk factors. Patient test results are still pending.  Will call Monday to review results

## 2021-11-27 LAB
SARS-COV-2: NORMAL
SARS-COV-2: NOT DETECTED
SOURCE: NORMAL

## 2021-11-29 ENCOUNTER — CARE COORDINATION (OUTPATIENT)
Dept: CARE COORDINATION | Age: 71
End: 2021-11-29

## 2021-11-29 NOTE — CARE COORDINATION
Called, message left on voicemail with my contact information patient's test results for COVID are negative.  No further outreach planned

## 2022-01-22 ENCOUNTER — HOSPITAL ENCOUNTER (OUTPATIENT)
Age: 72
Discharge: HOME | End: 2022-01-22
Payer: MEDICARE

## 2022-01-22 DIAGNOSIS — Z87.440 HISTORY OF URINARY TRACT INFECTION: ICD-10-CM

## 2022-01-22 DIAGNOSIS — N18.32 STAGE 3B CHRONIC KIDNEY DISEASE (HCC): ICD-10-CM

## 2022-01-22 DIAGNOSIS — I10 ESSENTIAL HYPERTENSION, BENIGN: ICD-10-CM

## 2022-01-22 LAB
ABSOLUTE EOS #: 0.36 K/UL (ref 0–0.44)
ABSOLUTE IMMATURE GRANULOCYTE: <0.03 K/UL (ref 0–0.3)
ABSOLUTE LYMPH #: 1.78 K/UL (ref 1.1–3.7)
ABSOLUTE MONO #: 0.8 K/UL (ref 0.1–1.2)
ANION GAP SERPL CALCULATED.3IONS-SCNC: 9 MMOL/L (ref 9–17)
BASOPHILS # BLD: 1 % (ref 0–2)
BASOPHILS ABSOLUTE: 0.1 K/UL (ref 0–0.2)
BUN BLDV-MCNC: 29 MG/DL (ref 8–23)
BUN/CREAT BLD: ABNORMAL (ref 9–20)
CALCIUM SERPL-MCNC: 10.2 MG/DL (ref 8.6–10.4)
CHLORIDE BLD-SCNC: 102 MMOL/L (ref 98–107)
CO2: 27 MMOL/L (ref 20–31)
CREAT SERPL-MCNC: 1.79 MG/DL (ref 0.7–1.2)
CREATININE URINE: 98.6 MG/DL (ref 39–259)
DIFFERENTIAL TYPE: ABNORMAL
EOSINOPHILS RELATIVE PERCENT: 5 % (ref 1–4)
GFR AFRICAN AMERICAN: 46 ML/MIN
GFR NON-AFRICAN AMERICAN: 38 ML/MIN
GFR SERPL CREATININE-BSD FRML MDRD: ABNORMAL ML/MIN/{1.73_M2}
GFR SERPL CREATININE-BSD FRML MDRD: ABNORMAL ML/MIN/{1.73_M2}
GLUCOSE BLD-MCNC: 90 MG/DL (ref 70–99)
HCT VFR BLD CALC: 44.1 % (ref 40.7–50.3)
HEMOGLOBIN: 14.4 G/DL (ref 13–17)
IMMATURE GRANULOCYTES: 0 %
LYMPHOCYTES # BLD: 24 % (ref 24–43)
MAGNESIUM: 2 MG/DL (ref 1.6–2.6)
MCH RBC QN AUTO: 29.8 PG (ref 25.2–33.5)
MCHC RBC AUTO-ENTMCNC: 32.7 G/DL (ref 28.4–34.8)
MCV RBC AUTO: 91.3 FL (ref 82.6–102.9)
MONOCYTES # BLD: 11 % (ref 3–12)
NRBC AUTOMATED: 0 PER 100 WBC
PDW BLD-RTO: 12.8 % (ref 11.8–14.4)
PHOSPHORUS: 3.6 MG/DL (ref 2.5–4.5)
PLATELET # BLD: 335 K/UL (ref 138–453)
PLATELET ESTIMATE: ABNORMAL
PMV BLD AUTO: 10 FL (ref 8.1–13.5)
POTASSIUM SERPL-SCNC: 5.6 MMOL/L (ref 3.7–5.3)
PTH INTACT: 64.17 PG/ML (ref 15–65)
RBC # BLD: 4.83 M/UL (ref 4.21–5.77)
RBC # BLD: ABNORMAL 10*6/UL
SEG NEUTROPHILS: 59 % (ref 36–65)
SEGMENTED NEUTROPHILS ABSOLUTE COUNT: 4.45 K/UL (ref 1.5–8.1)
SODIUM BLD-SCNC: 138 MMOL/L (ref 135–144)
TOTAL PROTEIN, URINE: 9 MG/DL
WBC # BLD: 7.5 K/UL (ref 3.5–11.3)
WBC # BLD: ABNORMAL 10*3/UL

## 2022-01-22 PROCEDURE — 82570 ASSAY OF URINE CREATININE: CPT

## 2022-01-22 PROCEDURE — 83735 ASSAY OF MAGNESIUM: CPT

## 2022-01-22 PROCEDURE — 85025 COMPLETE CBC W/AUTO DIFF WBC: CPT

## 2022-01-22 PROCEDURE — 82306 VITAMIN D 25 HYDROXY: CPT

## 2022-01-22 PROCEDURE — 83970 ASSAY OF PARATHORMONE: CPT

## 2022-01-22 PROCEDURE — 84100 ASSAY OF PHOSPHORUS: CPT

## 2022-01-22 PROCEDURE — 84156 ASSAY OF PROTEIN URINE: CPT

## 2022-01-22 PROCEDURE — 36415 COLL VENOUS BLD VENIPUNCTURE: CPT

## 2022-01-22 PROCEDURE — 80048 BASIC METABOLIC PNL TOTAL CA: CPT

## 2022-01-23 LAB — VITAMIN D 25-HYDROXY: 62.4 NG/ML (ref 30–100)

## 2022-02-01 ENCOUNTER — HOSPITAL ENCOUNTER (OUTPATIENT)
Age: 72
Discharge: HOME | End: 2022-02-01
Payer: MEDICARE

## 2022-02-01 PROCEDURE — 36415 COLL VENOUS BLD VENIPUNCTURE: CPT

## 2022-02-01 PROCEDURE — 80048 BASIC METABOLIC PNL TOTAL CA: CPT

## 2022-02-02 LAB
ANION GAP SERPL CALCULATED.3IONS-SCNC: 9 MMOL/L (ref 9–17)
BUN BLDV-MCNC: 28 MG/DL (ref 8–23)
BUN/CREAT BLD: ABNORMAL (ref 9–20)
CALCIUM SERPL-MCNC: 9.6 MG/DL (ref 8.6–10.4)
CHLORIDE BLD-SCNC: 102 MMOL/L (ref 98–107)
CO2: 27 MMOL/L (ref 20–31)
CREAT SERPL-MCNC: 1.7 MG/DL (ref 0.7–1.2)
GFR AFRICAN AMERICAN: 49 ML/MIN
GFR NON-AFRICAN AMERICAN: 40 ML/MIN
GFR SERPL CREATININE-BSD FRML MDRD: ABNORMAL ML/MIN/{1.73_M2}
GFR SERPL CREATININE-BSD FRML MDRD: ABNORMAL ML/MIN/{1.73_M2}
GLUCOSE BLD-MCNC: 87 MG/DL (ref 70–99)
POTASSIUM SERPL-SCNC: 4.7 MMOL/L (ref 3.7–5.3)
SODIUM BLD-SCNC: 138 MMOL/L (ref 135–144)

## 2022-05-18 LAB
ABSOLUTE BASO #: 0.1 X10E9/L (ref 0–0.2)
ABSOLUTE EOS #: 0.2 X10E9/L (ref 0–0.4)
ABSOLUTE LYMPH #: 1.7 X10E9/L (ref 1–3.5)
ABSOLUTE MONO #: 0.7 X10E9/L (ref 0–0.9)
ABSOLUTE NEUT #: 5 X10E9/L (ref 1.5–6.6)
ANION GAP SERPL CALCULATED.3IONS-SCNC: 10 MMOL/L (ref 5–15)
BASOPHILS RELATIVE PERCENT: 1.5 %
BUN BLDV-MCNC: 30 MG/DL (ref 5–27)
CALCIUM SERPL-MCNC: 9.9 MG/DL (ref 8.5–10.5)
CHLORIDE BLD-SCNC: 102 MMOL/L (ref 98–109)
CO2: 26 MMOL/L (ref 22–32)
CREAT SERPL-MCNC: 1.72 MG/DL (ref 0.6–1.3)
CREATINE, URINE: 34.29 MG/DL
EGFR AFRICAN AMERICAN: 48 ML/MIN/1.73SQ.M
EGFR IF NONAFRICAN AMERICAN: 40 ML/MIN/1.73SQ.M
EOSINOPHILS RELATIVE PERCENT: 2.6 %
GLUCOSE: 88 MG/DL (ref 65–99)
HCT VFR BLD CALC: 41.9 % (ref 39–49)
HEMOGLOBIN: 14.2 G/DL (ref 13–17)
LYMPHOCYTE %: 22.5 %
MAGNESIUM: 2 MG/DL (ref 1.8–2.6)
MCH RBC QN AUTO: 29.9 PG (ref 27–34)
MCHC RBC AUTO-ENTMCNC: 33.9 G/DL (ref 32–36)
MCV RBC AUTO: 88 FL (ref 80–100)
MONOCYTES # BLD: 8.5 %
NEUTROPHILS RELATIVE PERCENT: 64.9 %
PARATHYROID HORMONE INTACT: 51 PG/ML (ref 15–65)
PDW BLD-RTO: 14 % (ref 11.5–15)
PHOSPHORUS: 3.3 MG/DL (ref 2.4–4.9)
PLATELETS: 324 X10E9/L (ref 150–450)
PMV BLD AUTO: 8.1 FL (ref 7–12)
POTASSIUM SERPL-SCNC: 4.7 MMOL/L (ref 3.5–5)
PROTEIN, URINE: <40 MG/L
RBC: 4.75 X10E12/L (ref 4.1–5.7)
SODIUM BLD-SCNC: 138 MMOL/L (ref 134–146)
VITAMIN D 25-HYDROXY: 73.6 NG/ML (ref 30–100)
WBC: 7.8 X10E9/L (ref 4–11)

## 2022-09-09 LAB
ABSOLUTE BASO #: 0.06 K/UL (ref 0–0.2)
ABSOLUTE EOS #: 0.16 K/UL (ref 0–0.5)
ABSOLUTE LYMPH #: 1.79 K/UL (ref 1–4)
ABSOLUTE MONO #: 0.73 K/UL (ref 0.2–1)
ABSOLUTE NEUT #: 4.81 K/UL (ref 1.5–7.5)
BASOPHILS RELATIVE PERCENT: 0.8 %
EOSINOPHILS RELATIVE PERCENT: 2.1 %
HCT VFR BLD CALC: 42.9 % (ref 40–51)
HEMOGLOBIN: 14 G/DL (ref 13.5–17)
LYMPHOCYTE %: 23.6 %
MCH RBC QN AUTO: 28.9 PG (ref 25–33)
MCHC RBC AUTO-ENTMCNC: 32.6 G/DL (ref 31–36)
MCV RBC AUTO: 88.5 FL (ref 80–99)
MONOCYTES # BLD: 9.6 %
NEUTROPHILS RELATIVE PERCENT: 63.6 %
PDW BLD-RTO: 12.6 % (ref 11.5–15)
PLATELETS: 341 K/UL (ref 130–400)
PMV BLD AUTO: 9.9 FL (ref 9.3–13)
RBC: 4.85 M/UL (ref 4.5–6.1)
WBC: 7.6 K/UL (ref 3.5–11)

## 2022-09-10 LAB
ANION GAP SERPL CALCULATED.3IONS-SCNC: 9 MEQ/L (ref 7–16)
BUN BLDV-MCNC: 25 MG/DL (ref 8–23)
CALCIUM SERPL-MCNC: 9.9 MG/DL (ref 8.5–10.5)
CHLORIDE BLD-SCNC: 102 MEQ/L (ref 95–107)
CO2: 29 MEQ/L (ref 19–31)
CREAT SERPL-MCNC: 1.52 MG/DL (ref 0.8–1.4)
CREATINE, URINE: 39.1 MG/DL
EGFR IF NONAFRICAN AMERICAN: 49 ML/MIN/1.73
GLUCOSE: 81 MG/DL (ref 70–99)
MAGNESIUM: 1.9 MG/DL (ref 1.6–2.6)
PHOSPHORUS: 3.3 MG/DL (ref 2.5–4.5)
POTASSIUM SERPL-SCNC: 4.5 MEQ/L (ref 3.5–5.4)
PROTEIN, URINE: 5 MG/DL
SODIUM BLD-SCNC: 140 MEQ/L (ref 133–146)
VITAMIN D 25-HYDROXY: 55 NG/ML

## 2022-09-13 LAB — PARATHYROID HORMONE INTACT: 62 PG/ML (ref 15–65)

## 2023-02-24 ENCOUNTER — HOSPITAL ENCOUNTER (OUTPATIENT)
Dept: GENERAL RADIOLOGY | Age: 73
End: 2023-02-24
Payer: MEDICARE

## 2023-02-24 ENCOUNTER — HOSPITAL ENCOUNTER (OUTPATIENT)
Age: 73
End: 2023-02-24
Payer: MEDICARE

## 2023-02-24 DIAGNOSIS — M17.12 ARTHRITIS OF KNEE, LEFT: ICD-10-CM

## 2023-02-24 PROCEDURE — 73562 X-RAY EXAM OF KNEE 3: CPT

## 2023-03-16 LAB
ABSOLUTE BASO #: 0.08 K/UL (ref 0–0.2)
ABSOLUTE EOS #: 0.46 K/UL (ref 0–0.5)
ABSOLUTE LYMPH #: 2.07 K/UL (ref 1–4)
ABSOLUTE MONO #: 0.88 K/UL (ref 0.2–1)
ABSOLUTE NEUT #: 4.81 K/UL (ref 1.5–7.5)
ANION GAP SERPL CALCULATED.3IONS-SCNC: 10 MEQ/L (ref 7–16)
BASOPHILS RELATIVE PERCENT: 1 %
BUN BLDV-MCNC: 33 MG/DL (ref 8–23)
CALCIUM SERPL-MCNC: 10.2 MG/DL (ref 8.5–10.5)
CHLORIDE BLD-SCNC: 98 MEQ/L (ref 95–107)
CO2: 28 MEQ/L (ref 19–31)
CREAT SERPL-MCNC: 1.64 MG/DL (ref 0.8–1.4)
CREATINE, URINE: 66 MG/DL
EGFR IF NONAFRICAN AMERICAN: 44 ML/MIN/1.73
EOSINOPHILS RELATIVE PERCENT: 5.5 %
GLUCOSE: 103 MG/DL (ref 70–99)
HCT VFR BLD CALC: 41.4 % (ref 40–51)
HEMOGLOBIN: 14.1 G/DL (ref 13.5–17)
LYMPHOCYTE %: 24.8 %
MAGNESIUM: 2 MG/DL (ref 1.6–2.6)
MCH RBC QN AUTO: 29.6 PG (ref 25–33)
MCHC RBC AUTO-ENTMCNC: 34.1 G/DL (ref 31–36)
MCV RBC AUTO: 87 FL (ref 80–99)
MONOCYTES # BLD: 10.6 %
NEUTROPHILS RELATIVE PERCENT: 57.7 %
PDW BLD-RTO: 13 % (ref 11.5–15)
PHOSPHORUS: 4.1 MG/DL (ref 2.5–4.5)
PLATELETS: 341 K/UL (ref 130–400)
PMV BLD AUTO: 9.5 FL (ref 9.3–13)
POTASSIUM SERPL-SCNC: 4.6 MEQ/L (ref 3.5–5.4)
PROTEIN, URINE: 9 MG/DL
RBC: 4.76 M/UL (ref 4.5–6.1)
SODIUM BLD-SCNC: 136 MEQ/L (ref 133–146)
WBC: 8.3 K/UL (ref 3.5–11)

## 2023-03-17 LAB — VITAMIN D 25-HYDROXY: 53 NG/ML

## 2023-03-18 LAB — PARATHYROID HORMONE INTACT: 64 PG/ML (ref 15–65)

## 2023-08-17 ENCOUNTER — OFFICE VISIT (OUTPATIENT)
Age: 73
End: 2023-08-17
Payer: MEDICARE

## 2023-08-17 DIAGNOSIS — R35.1 NOCTURIA: ICD-10-CM

## 2023-08-17 DIAGNOSIS — Z85.528 PERSONAL HISTORY OF KIDNEY CANCER: ICD-10-CM

## 2023-08-17 DIAGNOSIS — N40.1 BENIGN PROSTATIC HYPERPLASIA WITH URINARY OBSTRUCTION: Primary | ICD-10-CM

## 2023-08-17 DIAGNOSIS — N52.8 OTHER MALE ERECTILE DYSFUNCTION: ICD-10-CM

## 2023-08-17 DIAGNOSIS — N13.8 BENIGN PROSTATIC HYPERPLASIA WITH URINARY OBSTRUCTION: Primary | ICD-10-CM

## 2023-08-17 LAB
BILIRUBIN, POC: NORMAL
BLOOD URINE, POC: NORMAL
CLARITY, POC: CLEAR
COLOR, POC: YELLOW
GLUCOSE URINE, POC: NORMAL
KETONES, POC: NORMAL
LEUKOCYTE EST, POC: NORMAL
NITRITE, POC: NORMAL
PH, POC: NORMAL
PROTEIN, POC: NORMAL
SPECIFIC GRAVITY, POC: NORMAL
UROBILINOGEN, POC: NORMAL

## 2023-08-17 PROCEDURE — G8427 DOCREV CUR MEDS BY ELIG CLIN: HCPCS | Performed by: SPECIALIST

## 2023-08-17 PROCEDURE — G8417 CALC BMI ABV UP PARAM F/U: HCPCS | Performed by: SPECIALIST

## 2023-08-17 PROCEDURE — 1123F ACP DISCUSS/DSCN MKR DOCD: CPT | Performed by: SPECIALIST

## 2023-08-17 PROCEDURE — 1036F TOBACCO NON-USER: CPT | Performed by: SPECIALIST

## 2023-08-17 PROCEDURE — 99214 OFFICE O/P EST MOD 30 MIN: CPT | Performed by: SPECIALIST

## 2023-08-17 PROCEDURE — 3017F COLORECTAL CA SCREEN DOC REV: CPT | Performed by: SPECIALIST

## 2023-08-17 PROCEDURE — 81003 URINALYSIS AUTO W/O SCOPE: CPT | Performed by: SPECIALIST

## 2023-08-17 NOTE — PROGRESS NOTES
[facility] [address] Urology Office Progress Note    Patient:  Anita Miles  YOB: 1950  Date: 8/17/2023    HISTORY OF PRESENT ILLNESS:   The patient is a 68 y.o. male  Patient's lower urinary tract symptoms are stable on Flomax/tamsulosin 0.4 mg po qd and Finasteride 5 mg po qd for BPH symptoms. Patient instructed to limit fluid intake 2-3 hours prior to bedtime to minimize nocturia or nocturnal incontinence. Patient admits to drinking pop at bedtime. Patient offered Rx with generic sildenafil 100 mg (1/2-1 tab) prn ED and Tadalafil (Cialis) 20 mg prn ED. He stated that his nephrologist didn't think that was a good idea due to his chronic renal disease. I will follow up and confirm this with  Dr. Pat Moss. Lower urinary tract symptoms: urgency, frequency, hesitancy, decreased urinary stream, and nocturia, 3 times per night   Last AUA Symptom Score (QOL): 6 (2)  Today's AUA Symptom Score (QOL): 7 (2)    Summary of old records:   Right kidney cancer s/p right open partial nephrectomy 6/24/14 and subsequent complete embolization for bleeding, Ta N0; 7/8/21 CT-atrophic right kidney and 5.4 cm left renal cyst  BPH on finasteride 5 mg qd  and tamsulosin 0.4 mg qd  Nx3 due to excess fluids  Right spermatocele 1.1 cm on 8/14/14 scrotal U/S  Long standing microhematuria  Left renal cyst 2.6 cm on 10/3/14 renal U/S; 5.4 cm on 7/8/21 CT  ED: Viagra 100 mg and Cialis 20 didn't work (nephrology may have issue with use); not interested in Trimix.     Additional History: none    Procedures Today: N/A    Urinalysis today:  Results for POC orders placed in visit on 08/17/23   POCT Urinalysis No Micro (Auto)   Result Value Ref Range    Color, UA yellow     Clarity, UA clear     Glucose, UA POC neg     Bilirubin, UA      Ketones, UA      Spec Grav, UA      Blood, UA POC neg     pH, UA      Protein, UA POC neg     Urobilinogen, UA      Leukocytes, UA neg     Nitrite, UA neg

## 2023-08-26 RX ORDER — TRIAMCINOLONE ACETONIDE 40 MG/ML
40 INJECTION, SUSPENSION INTRA-ARTICULAR; INTRAMUSCULAR ONCE
COMMUNITY
End: 2023-09-13

## 2023-08-26 RX ORDER — DICLOFENAC SODIUM 20 MG/G
SOLUTION TOPICAL 2 TIMES DAILY
COMMUNITY

## 2023-08-26 RX ORDER — CLINDAMYCIN HYDROCHLORIDE 300 MG/1
300 CAPSULE ORAL 2 TIMES DAILY
COMMUNITY
Start: 2023-08-09

## 2023-09-01 ENCOUNTER — TELEPHONE (OUTPATIENT)
Age: 73
End: 2023-09-01

## 2023-09-01 DIAGNOSIS — R31.29 MICROSCOPIC HEMATURIA: ICD-10-CM

## 2023-09-01 DIAGNOSIS — N18.32 STAGE 3B CHRONIC KIDNEY DISEASE (HCC): Primary | ICD-10-CM

## 2023-09-01 DIAGNOSIS — E55.9 HYPOVITAMINOSIS D: ICD-10-CM

## 2023-09-01 DIAGNOSIS — I10 ESSENTIAL HYPERTENSION, BENIGN: ICD-10-CM

## 2023-09-01 NOTE — TELEPHONE ENCOUNTER
Patient asked if Dr Meghan Caldwell would like to order labs for his upcoming appt. Please advise. He will be going to a [facility] facility. Please advise when orders are in the chart.   Ok to l/adrienne morgan : [phone]

## 2023-09-07 ENCOUNTER — HOSPITAL ENCOUNTER (OUTPATIENT)
Age: 73
Discharge: HOME | End: 2023-09-07
Payer: MEDICARE

## 2023-09-07 DIAGNOSIS — N18.32 STAGE 3B CHRONIC KIDNEY DISEASE (HCC): ICD-10-CM

## 2023-09-07 DIAGNOSIS — E55.9 HYPOVITAMINOSIS D: ICD-10-CM

## 2023-09-07 DIAGNOSIS — R31.29 MICROSCOPIC HEMATURIA: ICD-10-CM

## 2023-09-07 DIAGNOSIS — I10 ESSENTIAL HYPERTENSION, BENIGN: ICD-10-CM

## 2023-09-07 LAB
BASOPHILS # BLD: 0.08 K/UL (ref 0–0.2)
BASOPHILS NFR BLD: 1 % (ref 0–2)
EOSINOPHIL # BLD: 0.22 K/UL (ref 0–0.44)
EOSINOPHILS RELATIVE PERCENT: 3 % (ref 1–4)
ERYTHROCYTE [DISTWIDTH] IN BLOOD BY AUTOMATED COUNT: 13.2 % (ref 11.8–14.4)
HCT VFR BLD AUTO: 42.9 % (ref 40.7–50.3)
HGB BLD-MCNC: 13.6 G/DL (ref 13–17)
IMM GRANULOCYTES # BLD AUTO: <0.03 K/UL (ref 0–0.3)
IMM GRANULOCYTES NFR BLD: 0 %
LYMPHOCYTES NFR BLD: 1.5 K/UL (ref 1.1–3.7)
LYMPHOCYTES RELATIVE PERCENT: 19 % (ref 24–43)
MCH RBC QN AUTO: 29.4 PG (ref 25.2–33.5)
MCHC RBC AUTO-ENTMCNC: 31.7 G/DL (ref 28.4–34.8)
MCV RBC AUTO: 92.9 FL (ref 82.6–102.9)
MONOCYTES NFR BLD: 0.67 K/UL (ref 0.1–1.2)
MONOCYTES NFR BLD: 9 % (ref 3–12)
NEUTROPHILS NFR BLD: 68 % (ref 36–65)
NEUTS SEG NFR BLD: 5.31 K/UL (ref 1.5–8.1)
NRBC BLD-RTO: 0 PER 100 WBC
PLATELET # BLD AUTO: 341 K/UL (ref 138–453)
PMV BLD AUTO: 9.5 FL (ref 8.1–13.5)
RBC # BLD AUTO: 4.62 M/UL (ref 4.21–5.77)
WBC OTHER # BLD: 7.8 K/UL (ref 3.5–11.3)

## 2023-09-07 PROCEDURE — 80053 COMPREHEN METABOLIC PANEL: CPT

## 2023-09-07 PROCEDURE — 85025 COMPLETE CBC W/AUTO DIFF WBC: CPT

## 2023-09-07 PROCEDURE — 80061 LIPID PANEL: CPT

## 2023-09-07 PROCEDURE — 36415 COLL VENOUS BLD VENIPUNCTURE: CPT

## 2023-09-07 PROCEDURE — G0103 PSA SCREENING: HCPCS

## 2023-09-08 LAB
ALBUMIN SERPL-MCNC: 4.1 G/DL (ref 3.5–5.2)
ALBUMIN/GLOB SERPL: 1.3 {RATIO} (ref 1–2.5)
ALP SERPL-CCNC: 129 U/L (ref 40–129)
ALT SERPL-CCNC: 43 U/L (ref 5–41)
ANION GAP SERPL CALCULATED.3IONS-SCNC: 10 MMOL/L (ref 9–17)
AST SERPL-CCNC: 31 U/L
BILIRUB SERPL-MCNC: 0.5 MG/DL (ref 0.3–1.2)
BUN SERPL-MCNC: 23 MG/DL (ref 8–23)
CALCIUM SERPL-MCNC: 9.4 MG/DL (ref 8.6–10.4)
CHLORIDE SERPL-SCNC: 102 MMOL/L (ref 98–107)
CHOLEST SERPL-MCNC: 120 MG/DL
CHOLESTEROL/HDL RATIO: 3.2
CO2 SERPL-SCNC: 26 MMOL/L (ref 20–31)
CREAT SERPL-MCNC: 1.4 MG/DL (ref 0.7–1.2)
GFR SERPL CREATININE-BSD FRML MDRD: 53 ML/MIN/1.73M2
GLUCOSE SERPL-MCNC: 81 MG/DL (ref 70–99)
HDLC SERPL-MCNC: 38 MG/DL
LDLC SERPL CALC-MCNC: 49 MG/DL (ref 0–130)
POTASSIUM SERPL-SCNC: 4.7 MMOL/L (ref 3.7–5.3)
PROT SERPL-MCNC: 7.2 G/DL (ref 6.4–8.3)
PSA SERPL-MCNC: 0.22 NG/ML
SODIUM SERPL-SCNC: 138 MMOL/L (ref 135–144)
TRIGL SERPL-MCNC: 166 MG/DL

## 2023-09-09 PROBLEM — I80.291: Status: ACTIVE | Noted: 2023-09-09

## 2023-09-13 ENCOUNTER — OFFICE VISIT (OUTPATIENT)
Age: 73
End: 2023-09-13

## 2023-09-13 VITALS
BODY MASS INDEX: 29.89 KG/M2 | RESPIRATION RATE: 16 BRPM | SYSTOLIC BLOOD PRESSURE: 130 MMHG | HEIGHT: 70 IN | OXYGEN SATURATION: 96 % | HEART RATE: 68 BPM | TEMPERATURE: 97.3 F | DIASTOLIC BLOOD PRESSURE: 82 MMHG | WEIGHT: 208.8 LBS

## 2023-09-13 DIAGNOSIS — F41.9 ANXIETY: Primary | ICD-10-CM

## 2023-09-13 DIAGNOSIS — K44.9 HIATAL HERNIA: ICD-10-CM

## 2023-09-13 DIAGNOSIS — I38 HEART VALVE DISEASE: ICD-10-CM

## 2023-09-13 DIAGNOSIS — N18.32 STAGE 3B CHRONIC KIDNEY DISEASE (HCC): ICD-10-CM

## 2023-09-13 DIAGNOSIS — I10 ESSENTIAL HYPERTENSION, BENIGN: Primary | ICD-10-CM

## 2023-09-13 DIAGNOSIS — C80.1 CANCER (HCC): ICD-10-CM

## 2023-09-13 DIAGNOSIS — K80.20 GALLSTONES: ICD-10-CM

## 2023-09-13 DIAGNOSIS — M19.90 ARTHRITIS: ICD-10-CM

## 2023-09-13 DIAGNOSIS — Z78.9 CAFFEINE USE: ICD-10-CM

## 2023-09-13 DIAGNOSIS — Z95.2 HISTORY OF ARTIFICIAL HEART VALVE: ICD-10-CM

## 2023-09-13 PROBLEM — M54.9 CHRONIC BACK PAIN: Status: ACTIVE | Noted: 2023-09-13

## 2023-09-13 PROBLEM — I71.9 AORTIC ANEURYSM (HCC): Status: ACTIVE | Noted: 2023-09-13

## 2023-09-13 PROBLEM — J45.909 ASTHMA: Status: ACTIVE | Noted: 2023-09-13

## 2023-09-13 PROBLEM — G89.29 CHRONIC BACK PAIN: Status: ACTIVE | Noted: 2023-09-13

## 2023-09-13 PROBLEM — C44.91 BASAL CELL CARCINOMA: Status: ACTIVE | Noted: 2023-09-13

## 2023-09-13 PROBLEM — E78.5 HYPERLIPIDEMIA: Status: ACTIVE | Noted: 2023-09-13

## 2023-09-13 PROBLEM — H40.9 GLAUCOMA: Status: ACTIVE | Noted: 2023-09-13

## 2023-09-13 PROBLEM — J44.9 COPD (CHRONIC OBSTRUCTIVE PULMONARY DISEASE) (HCC): Status: ACTIVE | Noted: 2023-09-13

## 2023-09-13 PROBLEM — H26.9 CATARACT: Status: ACTIVE | Noted: 2023-09-13

## 2023-09-13 PROBLEM — I34.0 MITRAL REGURGITATION: Status: ACTIVE | Noted: 2023-09-13

## 2023-09-13 RX ORDER — OMEPRAZOLE 20 MG/1
20 CAPSULE, DELAYED RELEASE ORAL DAILY
Qty: 90 CAPSULE | Refills: 3 | Status: SHIPPED | OUTPATIENT
Start: 2023-09-13

## 2023-09-13 RX ORDER — LORATADINE 10 MG/1
2 TABLET ORAL 2 TIMES DAILY
COMMUNITY

## 2023-09-13 SDOH — ECONOMIC STABILITY: FOOD INSECURITY: WITHIN THE PAST 12 MONTHS, THE FOOD YOU BOUGHT JUST DIDN'T LAST AND YOU DIDN'T HAVE MONEY TO GET MORE.: NEVER TRUE

## 2023-09-13 SDOH — ECONOMIC STABILITY: FOOD INSECURITY: WITHIN THE PAST 12 MONTHS, YOU WORRIED THAT YOUR FOOD WOULD RUN OUT BEFORE YOU GOT MONEY TO BUY MORE.: NEVER TRUE

## 2023-09-13 SDOH — ECONOMIC STABILITY: HOUSING INSECURITY
IN THE LAST 12 MONTHS, WAS THERE A TIME WHEN YOU DID NOT HAVE A STEADY PLACE TO SLEEP OR SLEPT IN A SHELTER (INCLUDING NOW)?: NO

## 2023-09-13 SDOH — ECONOMIC STABILITY: INCOME INSECURITY: HOW HARD IS IT FOR YOU TO PAY FOR THE VERY BASICS LIKE FOOD, HOUSING, MEDICAL CARE, AND HEATING?: NOT HARD AT ALL

## 2023-09-13 ASSESSMENT — ENCOUNTER SYMPTOMS
SHORTNESS OF BREATH: 0
CHEST TIGHTNESS: 0

## 2023-09-13 ASSESSMENT — PATIENT HEALTH QUESTIONNAIRE - PHQ9
SUM OF ALL RESPONSES TO PHQ9 QUESTIONS 1 & 2: 0
SUM OF ALL RESPONSES TO PHQ QUESTIONS 1-9: 0
2. FEELING DOWN, DEPRESSED OR HOPELESS: 0
1. LITTLE INTEREST OR PLEASURE IN DOING THINGS: 0

## 2023-09-13 NOTE — PROGRESS NOTES
[facility]      Date of Visit:  2023  Patient Name: Nury Osorio   Patient :  1950     CHIEF COMPLAINT/HPI:     Nury Osorio is a 68 y.o. male who presents today for an general visit to be evaluated for the following condition(s):  Chief Complaint   Patient presents with    Blood Work     Patient is here for 6 month check up. He had blood work done on  (in chart). Patient here for 6 month f/iu he denies CP/SOB/systemic issues. Has been feeling well without complaint. REVIEW OF SYSTEM      Review of Systems   Respiratory:  Negative for chest tightness and shortness of breath. Cardiovascular:  Negative for chest pain. REVIEWED INFORMATION      Allergies   Allergen Reactions    Pcn [Penicillins]      Edema         Current Outpatient Medications   Medication Sig Note Dispense Refill    omeprazole (PRILOSEC) 20 MG delayed release capsule Take 1 capsule by mouth daily  90 capsule 3    Diclofenac Sodium 2 % SOLN Apply topically 2 times daily       clindamycin (CLEOCIN) 300 MG capsule Take 1 capsule by mouth 2 times daily       furosemide (LASIX) 20 MG tablet TAKE 1 TABLET BY MOUTH  DAILY  90 tablet 3    finasteride (PROSCAR) 5 MG tablet Take 1 tablet by mouth daily  90 tablet 3    tamsulosin (FLOMAX) 0.4 MG capsule TAKE 1 CAPSULE BY MOUTH  DAILY  100 capsule 1    lisinopril (PRINIVIL;ZESTRIL) 10 MG tablet TAKE 1 TABLET BY MOUTH  DAILY  90 tablet 3    ammonium lactate (LAC-HYDRIN) 12 % lotion        acetaminophen (TYLENOL) 500 MG tablet Take 1 tablet by mouth every 6 hours as needed for Pain       HYDROcodone-acetaminophen (NORCO) 5-325 MG per tablet Take 1 tablet by mouth every 6 hours as needed for Pain.        KRILL OIL PO Take 350 mg by mouth daily       vitamin D (CHOLECALCIFEROL) 1000 UNIT TABS tablet Take 1 tablet by mouth daily       allopurinol (ZYLOPRIM) 100 MG tablet Take 1 tablet by mouth daily 11/15/2016: Received from: External Pharmacy Received Sig: take 1

## 2023-09-15 LAB
ABSOLUTE BASO #: 0.06 K/UL (ref 0–0.2)
ABSOLUTE EOS #: 0.21 K/UL (ref 0–0.5)
ABSOLUTE LYMPH #: 1.71 K/UL (ref 1–4)
ABSOLUTE MONO #: 0.76 K/UL (ref 0.2–1)
ABSOLUTE NEUT #: 4.95 K/UL (ref 1.5–7.5)
ANION GAP SERPL CALCULATED.3IONS-SCNC: 8 MEQ/L (ref 7–16)
BASOPHILS RELATIVE PERCENT: 0.8 %
BUN BLDV-MCNC: 27 MG/DL (ref 8–23)
CALCIUM SERPL-MCNC: 9.9 MG/DL (ref 8.5–10.5)
CHLORIDE BLD-SCNC: 100 MEQ/L (ref 95–107)
CO2: 29 MEQ/L (ref 19–31)
CREAT SERPL-MCNC: 1.79 MG/DL (ref 0.8–1.4)
EGFR IF NONAFRICAN AMERICAN: 40 ML/MIN/1.73
EOSINOPHILS RELATIVE PERCENT: 2.7 %
GLUCOSE: 96 MG/DL (ref 70–99)
HCT VFR BLD CALC: 40.6 % (ref 40–51)
HEMOGLOBIN: 13.8 G/DL (ref 13.5–17)
LYMPHOCYTE %: 22.2 %
MAGNESIUM: 2 MG/DL (ref 1.6–2.6)
MCH RBC QN AUTO: 29.6 PG (ref 25–33)
MCHC RBC AUTO-ENTMCNC: 34 G/DL (ref 31–36)
MCV RBC AUTO: 87.1 FL (ref 80–99)
MONOCYTES # BLD: 9.8 %
NEUTROPHILS RELATIVE PERCENT: 64.1 %
PDW BLD-RTO: 13 % (ref 11.5–15)
PHOSPHORUS: 4 MG/DL (ref 2.5–4.5)
PLATELETS: 336 K/UL (ref 130–400)
PMV BLD AUTO: 9.3 FL (ref 9.3–13)
POTASSIUM SERPL-SCNC: 4.4 MEQ/L (ref 3.5–5.4)
RBC: 4.66 M/UL (ref 4.5–6.1)
SODIUM BLD-SCNC: 137 MEQ/L (ref 133–146)
VITAMIN D 25-HYDROXY: 59 NG/ML
WBC: 7.7 K/UL (ref 3.5–11)

## 2023-09-16 LAB
APPEARANCE: CLEAR
BACTERIA: ABNORMAL PER HPF
BILIRUBIN: NEGATIVE
COLOR: YELLOW
CREATINE, URINE: 81.2 MG/DL
EPITHELIAL CELLS: ABNORMAL PER HPF (ref 0–5)
GLUCOSE BLD-MCNC: NEGATIVE MG/DL
HYALINE CASTS: ABNORMAL
KETONES, URINE: NEGATIVE
LEUKOCYTE ESTERASE, URINE: ABNORMAL
NITRITE, URINE: NEGATIVE
OCCULT BLOOD,URINE: NEGATIVE
PH: 6.5 (ref 5–9)
PROTEIN, URINE: <4 MG/DL
PROTEIN, URINE: NEGATIVE
RBC: ABNORMAL PER HPF (ref 0–5)
SP GRAVITY MISCELLANEOUS: 1.02 (ref 1–1.03)
UROBILINOGEN, URINE: 0.2
WBC: ABNORMAL PER HPF (ref 0–5)

## 2023-09-19 LAB — PARATHYROID HORMONE INTACT: 69 PG/ML (ref 15–65)

## 2023-09-19 RX ORDER — METOPROLOL SUCCINATE 50 MG/1
50 TABLET, EXTENDED RELEASE ORAL 2 TIMES DAILY
Qty: 200 TABLET | Refills: 2 | Status: SHIPPED | OUTPATIENT
Start: 2023-09-19

## 2023-09-19 NOTE — TELEPHONE ENCOUNTER
[facility] is calling to request a refill on the following medication(s):    Medication Request:  Requested Prescriptions     Pending Prescriptions Disp Refills    metoprolol succinate (TOPROL XL) 50 MG extended release tablet [Pharmacy Med Name: Metoprolol Succinate ER 50 MG Oral Tablet Extended Release 24 Hour] 200 tablet 2     Sig: TAKE 1 TABLET BY MOUTH TWICE  DAILY       Last Visit Date (If Applicable):  4/86/0767    Next Visit Date:    3/13/2024

## 2023-09-28 DIAGNOSIS — F41.9 ANXIETY: Primary | ICD-10-CM

## 2023-09-28 RX ORDER — LORAZEPAM 1 MG/1
1 TABLET ORAL NIGHTLY PRN
Qty: 90 TABLET | OUTPATIENT
Start: 2023-09-28

## 2023-09-28 RX ORDER — LORAZEPAM 1 MG/1
1 TABLET ORAL DAILY
Qty: 90 TABLET | Refills: 0 | Status: SHIPPED | OUTPATIENT
Start: 2023-09-28 | End: 2023-09-29 | Stop reason: SDUPTHER

## 2023-09-28 NOTE — TELEPHONE ENCOUNTER
Myke Tsang is calling to request a refill on the following medication(s):    Medication Request:  Requested Prescriptions     Pending Prescriptions Disp Refills    LORazepam (ATIVAN) 1 MG tablet 90 tablet 0     Sig: Take 1 tablet by mouth daily for 90 days.  Max Daily Amount: 1 mg       Last Visit Date (If Applicable):  2/89/1378    Next Visit Date:    3/13/2024

## 2023-09-29 ENCOUNTER — TELEPHONE (OUTPATIENT)
Age: 73
End: 2023-09-29

## 2023-09-29 DIAGNOSIS — F41.9 ANXIETY: ICD-10-CM

## 2023-09-29 RX ORDER — LORAZEPAM 1 MG/1
1 TABLET ORAL DAILY
Qty: 90 TABLET | Refills: 0 | Status: SHIPPED | OUTPATIENT
Start: 2023-09-29 | End: 2023-12-28

## 2023-09-29 NOTE — TELEPHONE ENCOUNTER
Nury Osorio is calling to request a refill on the following medication(s):    Medication Request:  Requested Prescriptions     Pending Prescriptions Disp Refills    LORazepam (ATIVAN) 1 MG tablet 90 tablet 0     Sig: Take 1 tablet by mouth daily for 90 days.  Max Daily Amount: 1 mg       Last Visit Date (If Applicable):  1/79/9350    Next Visit Date:    3/13/2024

## 2023-10-06 ENCOUNTER — NURSE ONLY (OUTPATIENT)
Age: 73
End: 2023-10-06

## 2023-10-06 DIAGNOSIS — Z23 NEED FOR INFLUENZA VACCINATION: Primary | ICD-10-CM

## 2023-10-06 NOTE — PROGRESS NOTES
After obtaining consent, and per orders of Dr. Donaldo Rodrigues, injection of Fluad given in Right deltoid by Jimmye Canavan, MA. Patient instructed to remain in clinic for 20 minutes afterwards, and to report any adverse reaction to me immediately. Tolerated well.

## 2023-11-10 DIAGNOSIS — M51.36 DDD (DEGENERATIVE DISC DISEASE), LUMBAR: Primary | ICD-10-CM

## 2023-11-10 RX ORDER — HYDROCODONE BITARTRATE AND ACETAMINOPHEN 5; 325 MG/1; MG/1
1 TABLET ORAL EVERY 6 HOURS PRN
Qty: 120 TABLET | Refills: 0 | Status: SHIPPED | OUTPATIENT
Start: 2023-11-10 | End: 2023-12-10

## 2023-11-10 NOTE — TELEPHONE ENCOUNTER
[facility]ario Bustillos is calling to request a refill on the following medication(s):    Medication Request:  Requested Prescriptions     Pending Prescriptions Disp Refills    HYDROcodone-acetaminophen (NORCO) 5-325 MG per tablet       Sig: Take 1 tablet by mouth every 6 hours as needed for Pain.  Max Daily Amount: 4 tablets       Last Visit Date (If Applicable):  7/23/6649    Next Visit Date:    3/13/2024

## 2023-11-28 RX ORDER — EZETIMIBE AND SIMVASTATIN 10; 20 MG/1; MG/1
TABLET ORAL
Qty: 100 TABLET | Refills: 2 | Status: SHIPPED | OUTPATIENT
Start: 2023-11-28

## 2023-11-28 NOTE — TELEPHONE ENCOUNTER
Chanel Soriano is calling to request a refill on the following medication(s):    Medication Request:  Requested Prescriptions     Pending Prescriptions Disp Refills    ezetimibe-simvastatin (VYTORIN) 10-20 MG per tablet [Pharmacy Med Name: EZETIMIB/SIMVASTATIN 10-20MG TABLET] 100 tablet 2     Sig: TAKE 1 TABLET BY MOUTH DAILY AS  DIRECTED       Last Visit Date (If Applicable):  6/47/5431    Next Visit Date:    3/13/2024

## 2023-12-12 DIAGNOSIS — F41.9 ANXIETY: ICD-10-CM

## 2023-12-12 RX ORDER — LORAZEPAM 1 MG/1
TABLET ORAL
Qty: 90 TABLET | Refills: 0 | Status: SHIPPED | OUTPATIENT
Start: 2023-12-12 | End: 2024-03-11

## 2023-12-12 NOTE — TELEPHONE ENCOUNTER
Maggie Leventhal is calling to request a refill on the following medication(s):    Medication Request:  Requested Prescriptions     Pending Prescriptions Disp Refills    LORazepam (ATIVAN) 1 MG tablet [Pharmacy Med Name: LORAZEPAM 1MG TABLETS] 90 tablet      Sig: TAKE 1 TABLET BY MOUTH DAILY.  MAX DAILY AMOUNT: 1 MG       Last Visit Date (If Applicable):  1/83/0282    Next Visit Date:    3/13/2024

## 2024-01-08 DIAGNOSIS — G89.29 CHRONIC MIDLINE LOW BACK PAIN WITH SCIATICA, SCIATICA LATERALITY UNSPECIFIED: Primary | ICD-10-CM

## 2024-01-08 DIAGNOSIS — M54.40 CHRONIC MIDLINE LOW BACK PAIN WITH SCIATICA, SCIATICA LATERALITY UNSPECIFIED: Primary | ICD-10-CM

## 2024-01-08 RX ORDER — HYDROCODONE BITARTRATE AND ACETAMINOPHEN 5; 325 MG/1; MG/1
1 TABLET ORAL EVERY 6 HOURS PRN
Qty: 60 TABLET | Refills: 0 | Status: SHIPPED | OUTPATIENT
Start: 2024-01-08 | End: 2024-02-07

## 2024-01-08 RX ORDER — HYDROCODONE BITARTRATE AND ACETAMINOPHEN 5; 325 MG/1; MG/1
1 TABLET ORAL EVERY 6 HOURS PRN
COMMUNITY
End: 2024-01-08 | Stop reason: SDUPTHER

## 2024-01-08 NOTE — TELEPHONE ENCOUNTER
Donnie Vicente is calling to request a refill on the following medication(s):    Medication Request:  Requested Prescriptions     Pending Prescriptions Disp Refills    HYDROcodone-acetaminophen (NORCO) 5-325 MG per tablet       Sig: Take 1 tablet by mouth every 6 hours as needed for Pain. Max Daily Amount: 4 tablets       Last Visit Date (If Applicable):  9/13/2023    Next Visit Date:    3/13/2024

## 2024-02-26 RX ORDER — ALLOPURINOL 100 MG/1
100 TABLET ORAL DAILY
Qty: 100 TABLET | Refills: 2 | Status: SHIPPED | OUTPATIENT
Start: 2024-02-26

## 2024-02-26 NOTE — TELEPHONE ENCOUNTER
Donnie Vicente is calling to request a refill on the following medication(s):    Medication Request:  Requested Prescriptions     Pending Prescriptions Disp Refills    allopurinol (ZYLOPRIM) 100 MG tablet [Pharmacy Med Name: Allopurinol 100 MG Oral Tablet] 100 tablet 2     Sig: TAKE 1 TABLET BY MOUTH ONCE  DAILY       Last Visit Date (If Applicable):  9/13/2023    Next Visit Date:    3/13/2024

## 2024-03-09 ENCOUNTER — HOSPITAL ENCOUNTER (OUTPATIENT)
Age: 74
Discharge: HOME | End: 2024-03-09
Payer: MEDICARE

## 2024-03-09 DIAGNOSIS — I10 ESSENTIAL HYPERTENSION, BENIGN: ICD-10-CM

## 2024-03-09 DIAGNOSIS — E87.5 HYPERKALEMIA: ICD-10-CM

## 2024-03-09 DIAGNOSIS — M19.90 ARTHRITIS: ICD-10-CM

## 2024-03-09 DIAGNOSIS — N18.32 STAGE 3B CHRONIC KIDNEY DISEASE (HCC): ICD-10-CM

## 2024-03-09 DIAGNOSIS — N28.1 ACQUIRED CYST OF KIDNEY: ICD-10-CM

## 2024-03-09 DIAGNOSIS — I38 HEART VALVE DISEASE: ICD-10-CM

## 2024-03-09 LAB
25(OH)D3 SERPL-MCNC: 50.4 NG/ML (ref 30–100)
ALBUMIN SERPL-MCNC: 4.2 G/DL (ref 3.5–5.2)
ALBUMIN SERPL-MCNC: 4.3 G/DL (ref 3.5–5.2)
ALBUMIN/GLOB SERPL: 1 {RATIO} (ref 1–2.5)
ALP SERPL-CCNC: 75 U/L (ref 40–129)
ALT SERPL-CCNC: 24 U/L (ref 10–50)
ANION GAP SERPL CALCULATED.3IONS-SCNC: 11 MMOL/L (ref 9–16)
ANION GAP SERPL CALCULATED.3IONS-SCNC: 11 MMOL/L (ref 9–16)
AST SERPL-CCNC: 28 U/L (ref 10–50)
BASOPHILS # BLD: 0.09 K/UL (ref 0–0.2)
BASOPHILS NFR BLD: 1 % (ref 0–2)
BILIRUB SERPL-MCNC: 0.4 MG/DL (ref 0–1.2)
BUN SERPL-MCNC: 24 MG/DL (ref 8–23)
BUN SERPL-MCNC: 25 MG/DL (ref 8–23)
CALCIUM SERPL-MCNC: 9.8 MG/DL (ref 8.6–10.4)
CALCIUM SERPL-MCNC: 9.8 MG/DL (ref 8.6–10.4)
CHLORIDE SERPL-SCNC: 101 MMOL/L (ref 98–107)
CHLORIDE SERPL-SCNC: 101 MMOL/L (ref 98–107)
CHOLEST SERPL-MCNC: 158 MG/DL (ref 0–199)
CHOLESTEROL/HDL RATIO: 4
CO2 SERPL-SCNC: 27 MMOL/L (ref 20–31)
CO2 SERPL-SCNC: 27 MMOL/L (ref 20–31)
CREAT SERPL-MCNC: 1.4 MG/DL (ref 0.7–1.2)
CREAT SERPL-MCNC: 1.5 MG/DL (ref 0.7–1.2)
CREAT UR-MCNC: 50.6 MG/DL (ref 39–259)
EOSINOPHIL # BLD: 0.24 K/UL (ref 0–0.44)
EOSINOPHILS RELATIVE PERCENT: 3 % (ref 1–4)
ERYTHROCYTE [DISTWIDTH] IN BLOOD BY AUTOMATED COUNT: 13.4 % (ref 11.8–14.4)
ERYTHROCYTE [DISTWIDTH] IN BLOOD BY AUTOMATED COUNT: 13.4 % (ref 11.8–14.4)
GFR SERPL CREATININE-BSD FRML MDRD: 50 ML/MIN/1.73M2
GFR SERPL CREATININE-BSD FRML MDRD: 52 ML/MIN/1.73M2
GLUCOSE SERPL-MCNC: 80 MG/DL (ref 74–99)
GLUCOSE SERPL-MCNC: 85 MG/DL (ref 74–99)
HCT VFR BLD AUTO: 43.3 % (ref 40.7–50.3)
HCT VFR BLD AUTO: 43.3 % (ref 40.7–50.3)
HDLC SERPL-MCNC: 42 MG/DL
HGB BLD-MCNC: 13.9 G/DL (ref 13–17)
HGB BLD-MCNC: 13.9 G/DL (ref 13–17)
IMM GRANULOCYTES # BLD AUTO: 0.03 K/UL (ref 0–0.3)
IMM GRANULOCYTES NFR BLD: 0 %
LDLC SERPL CALC-MCNC: 74 MG/DL (ref 0–100)
LYMPHOCYTES NFR BLD: 2.03 K/UL (ref 1.1–3.7)
LYMPHOCYTES RELATIVE PERCENT: 28 % (ref 24–43)
MCH RBC QN AUTO: 29.4 PG (ref 25.2–33.5)
MCH RBC QN AUTO: 29.4 PG (ref 25.2–33.5)
MCHC RBC AUTO-ENTMCNC: 32.1 G/DL (ref 28.4–34.8)
MCHC RBC AUTO-ENTMCNC: 32.1 G/DL (ref 28.4–34.8)
MCV RBC AUTO: 91.7 FL (ref 82.6–102.9)
MCV RBC AUTO: 91.7 FL (ref 82.6–102.9)
MONOCYTES NFR BLD: 0.82 K/UL (ref 0.1–1.2)
MONOCYTES NFR BLD: 11 % (ref 3–12)
NEUTROPHILS NFR BLD: 57 % (ref 36–65)
NEUTS SEG NFR BLD: 3.97 K/UL (ref 1.5–8.1)
NRBC BLD-RTO: 0 PER 100 WBC
NRBC BLD-RTO: 0 PER 100 WBC
PHOSPHATE SERPL-MCNC: 3.2 MG/DL (ref 2.5–4.5)
PLATELET # BLD AUTO: 305 K/UL (ref 138–453)
PLATELET # BLD AUTO: 305 K/UL (ref 138–453)
PMV BLD AUTO: 9.2 FL (ref 8.1–13.5)
PMV BLD AUTO: 9.2 FL (ref 8.1–13.5)
POTASSIUM SERPL-SCNC: 4.8 MMOL/L (ref 3.7–5.3)
POTASSIUM SERPL-SCNC: 4.9 MMOL/L (ref 3.7–5.3)
PROT SERPL-MCNC: 7.2 G/DL (ref 6.6–8.7)
PTH-INTACT SERPL-MCNC: 60 PG/ML (ref 15–65)
RBC # BLD AUTO: 4.72 M/UL (ref 4.21–5.77)
RBC # BLD AUTO: 4.72 M/UL (ref 4.21–5.77)
SODIUM SERPL-SCNC: 139 MMOL/L (ref 136–145)
SODIUM SERPL-SCNC: 139 MMOL/L (ref 136–145)
TOTAL PROTEIN, URINE: 10 MG/DL
TRIGL SERPL-MCNC: 210 MG/DL
URINE TOTAL PROTEIN CREATININE RATIO: 0.21
VLDLC SERPL CALC-MCNC: 42 MG/DL
WBC OTHER # BLD: 7.2 K/UL (ref 3.5–11.3)
WBC OTHER # BLD: 7.2 K/UL (ref 3.5–11.3)

## 2024-03-09 PROCEDURE — 82306 VITAMIN D 25 HYDROXY: CPT

## 2024-03-09 PROCEDURE — 80053 COMPREHEN METABOLIC PANEL: CPT

## 2024-03-09 PROCEDURE — 83970 ASSAY OF PARATHORMONE: CPT

## 2024-03-09 PROCEDURE — 36415 COLL VENOUS BLD VENIPUNCTURE: CPT

## 2024-03-09 PROCEDURE — 80061 LIPID PANEL: CPT

## 2024-03-09 PROCEDURE — 84156 ASSAY OF PROTEIN URINE: CPT

## 2024-03-09 PROCEDURE — 80048 BASIC METABOLIC PNL TOTAL CA: CPT

## 2024-03-09 PROCEDURE — 85025 COMPLETE CBC W/AUTO DIFF WBC: CPT

## 2024-03-09 PROCEDURE — 82040 ASSAY OF SERUM ALBUMIN: CPT

## 2024-03-09 PROCEDURE — 82570 ASSAY OF URINE CREATININE: CPT

## 2024-03-09 PROCEDURE — 84100 ASSAY OF PHOSPHORUS: CPT

## 2024-03-09 PROCEDURE — 85027 COMPLETE CBC AUTOMATED: CPT

## 2024-03-13 ENCOUNTER — OFFICE VISIT (OUTPATIENT)
Age: 74
End: 2024-03-13

## 2024-03-13 VITALS
TEMPERATURE: 98.2 F | DIASTOLIC BLOOD PRESSURE: 80 MMHG | BODY MASS INDEX: 30.67 KG/M2 | OXYGEN SATURATION: 97 % | WEIGHT: 214.2 LBS | HEART RATE: 67 BPM | SYSTOLIC BLOOD PRESSURE: 150 MMHG | HEIGHT: 70 IN

## 2024-03-13 DIAGNOSIS — J41.0 SIMPLE CHRONIC BRONCHITIS (HCC): ICD-10-CM

## 2024-03-13 DIAGNOSIS — N18.32 STAGE 3B CHRONIC KIDNEY DISEASE (HCC): ICD-10-CM

## 2024-03-13 DIAGNOSIS — E55.9 HYPOVITAMINOSIS D: Primary | ICD-10-CM

## 2024-03-13 DIAGNOSIS — I38 HEART VALVE DISEASE: ICD-10-CM

## 2024-03-13 ASSESSMENT — PATIENT HEALTH QUESTIONNAIRE - PHQ9
SUM OF ALL RESPONSES TO PHQ QUESTIONS 1-9: 0
SUM OF ALL RESPONSES TO PHQ9 QUESTIONS 1 & 2: 0
SUM OF ALL RESPONSES TO PHQ QUESTIONS 1-9: 0
SUM OF ALL RESPONSES TO PHQ QUESTIONS 1-9: 0
2. FEELING DOWN, DEPRESSED OR HOPELESS: 0
SUM OF ALL RESPONSES TO PHQ QUESTIONS 1-9: 0
1. LITTLE INTEREST OR PLEASURE IN DOING THINGS: 0

## 2024-03-13 NOTE — PROGRESS NOTES
round, and reactive to light.   Cardiovascular:      Rate and Rhythm: Normal rate and regular rhythm.      Pulses: Normal pulses.   Pulmonary:      Breath sounds: Normal breath sounds.   Abdominal:      General: Bowel sounds are normal.      Palpations: Abdomen is soft.   Musculoskeletal:         General: Normal range of motion.   Neurological:      Mental Status: He is alert and oriented to person, place, and time.   Psychiatric:         Mood and Affect: Mood normal.           ASSESSMENT/PLAN     1. Hypovitaminosis D  -     Vitamin D 25 Hydroxy; Future  2. Simple chronic bronchitis (HCC)  -     Comprehensive Metabolic Panel; Future  -     CBC with Auto Differential; Future  -     Lipid Panel; Future  -     Vitamin D 25 Hydroxy; Future  3. Stage 3b chronic kidney disease (HCC)  -     Comprehensive Metabolic Panel; Future  -     CBC with Auto Differential; Future  -     Lipid Panel; Future  -     Vitamin D 25 Hydroxy; Future  4. Heart valve disease  -     Comprehensive Metabolic Panel; Future  -     CBC with Auto Differential; Future  -     Lipid Panel; Future  -     Vitamin D 25 Hydroxy; Future       No orders of the defined types were placed in this encounter.            Return in about 6 months (around 9/13/2024).      COMMUNICATION:     Disposition and Communication:     Electronically signed by Adi Tinajero MD on 3/13/2024 at 2:44 PM

## 2024-03-15 ASSESSMENT — ENCOUNTER SYMPTOMS
SHORTNESS OF BREATH: 0
CHEST TIGHTNESS: 0

## 2024-03-25 DIAGNOSIS — F41.9 ANXIETY: ICD-10-CM

## 2024-03-25 RX ORDER — LORAZEPAM 1 MG/1
TABLET ORAL
Qty: 90 TABLET | Refills: 0 | Status: SHIPPED | OUTPATIENT
Start: 2024-03-25 | End: 2024-06-23

## 2024-03-25 NOTE — TELEPHONE ENCOUNTER
Donnie Vicente is calling to request a refill on the following medication(s):    Medication Request:  Requested Prescriptions     Pending Prescriptions Disp Refills    LORazepam (ATIVAN) 1 MG tablet [Pharmacy Med Name: LORAZEPAM 1MG TABLETS] 90 tablet 0     Sig: TAKE 1 TABLET BY MOUTH DAILY. MAX DAILY AMOUNT: 1 MG       Last Visit Date (If Applicable):  3/13/2024    Next Visit Date:    9/17/2024

## 2024-05-14 RX ORDER — OMEPRAZOLE 20 MG/1
20 CAPSULE, DELAYED RELEASE ORAL DAILY
Qty: 100 CAPSULE | Refills: 2 | Status: SHIPPED | OUTPATIENT
Start: 2024-05-14

## 2024-05-14 NOTE — TELEPHONE ENCOUNTER
Donnie Vicente is calling to request a refill on the following medication(s):    Medication Request:  Requested Prescriptions     Pending Prescriptions Disp Refills    omeprazole (PRILOSEC) 20 MG delayed release capsule [Pharmacy Med Name: Omeprazole 20 MG Oral Capsule Delayed Release] 100 capsule 2     Sig: TAKE 1 CAPSULE BY MOUTH DAILY       Last Visit Date (If Applicable):  3/13/2024    Next Visit Date:    9/17/2024

## 2024-05-21 RX ORDER — METOPROLOL SUCCINATE 50 MG/1
50 TABLET, EXTENDED RELEASE ORAL 2 TIMES DAILY
Qty: 200 TABLET | Refills: 2 | Status: SHIPPED | OUTPATIENT
Start: 2024-05-21

## 2024-05-21 NOTE — TELEPHONE ENCOUNTER
Donnie Vicente is calling to request a refill on the following medication(s):    Medication Request:  Requested Prescriptions     Pending Prescriptions Disp Refills    metoprolol succinate (TOPROL XL) 50 MG extended release tablet [Pharmacy Med Name: Metoprolol Succinate ER 50 MG Oral Tablet Extended Release 24 Hour] 200 tablet 2     Sig: TAKE 1 TABLET BY MOUTH TWICE  DAILY       Last Visit Date (If Applicable):  3/13/2024    Next Visit Date:    9/17/2024

## 2024-06-18 DIAGNOSIS — F41.9 ANXIETY: ICD-10-CM

## 2024-06-18 RX ORDER — LORAZEPAM 1 MG/1
TABLET ORAL
Qty: 90 TABLET | Refills: 0 | Status: SHIPPED | OUTPATIENT
Start: 2024-06-18 | End: 2024-09-16

## 2024-06-18 NOTE — TELEPHONE ENCOUNTER
Donnie Vicente is calling to request a refill on the following medication(s):    Medication Request:  Requested Prescriptions     Pending Prescriptions Disp Refills    LORazepam (ATIVAN) 1 MG tablet [Pharmacy Med Name: LORAZEPAM 1MG TABLETS] 90 tablet      Sig: TAKE 1 TABLET BY MOUTH DAILY. MAX DAILY AMOUNT: 1 MG       Last Visit Date (If Applicable):  3/13/2024    Next Visit Date:    9/17/2024

## 2024-07-29 ENCOUNTER — TELEPHONE (OUTPATIENT)
Age: 74
End: 2024-07-29

## 2024-07-29 NOTE — TELEPHONE ENCOUNTER
Pt having right hip pain can you see his this week in  one of your appointments every day you have your limit of patients to see ??  Please advise

## 2024-07-29 NOTE — TELEPHONE ENCOUNTER
See if another physician has openings this week.  If no openings this week with other physicians I will review my schedule and see where to fit in

## 2024-07-31 ENCOUNTER — OFFICE VISIT (OUTPATIENT)
Age: 74
End: 2024-07-31

## 2024-07-31 VITALS
TEMPERATURE: 97.7 F | SYSTOLIC BLOOD PRESSURE: 110 MMHG | WEIGHT: 213 LBS | HEIGHT: 70 IN | HEART RATE: 73 BPM | BODY MASS INDEX: 30.49 KG/M2 | DIASTOLIC BLOOD PRESSURE: 72 MMHG | OXYGEN SATURATION: 97 %

## 2024-07-31 DIAGNOSIS — M77.41 METATARSALGIA OF BOTH FEET: ICD-10-CM

## 2024-07-31 DIAGNOSIS — M25.551 RIGHT HIP PAIN: Primary | ICD-10-CM

## 2024-07-31 DIAGNOSIS — M77.42 METATARSALGIA OF BOTH FEET: ICD-10-CM

## 2024-07-31 DIAGNOSIS — M47.816 ARTHRITIS, LUMBAR SPINE: ICD-10-CM

## 2024-07-31 DIAGNOSIS — M41.125 ADOLESCENT IDIOPATHIC SCOLIOSIS OF THORACOLUMBAR REGION: ICD-10-CM

## 2024-07-31 RX ORDER — METHYLPREDNISOLONE ACETATE 80 MG/ML
80 INJECTION, SUSPENSION INTRA-ARTICULAR; INTRALESIONAL; INTRAMUSCULAR; SOFT TISSUE ONCE
Status: COMPLETED | OUTPATIENT
Start: 2024-07-31 | End: 2024-07-31

## 2024-07-31 RX ADMIN — METHYLPREDNISOLONE ACETATE 80 MG: 80 INJECTION, SUSPENSION INTRA-ARTICULAR; INTRALESIONAL; INTRAMUSCULAR; SOFT TISSUE at 17:40

## 2024-07-31 NOTE — PROGRESS NOTES
[facility]  [facility] MEDICINE  [address]  [place] [address]     Date of Visit:  2024  Patient Name: Donnie Vicente   Patient :  1950   Patient Age: 74 y.o.  Patient Sex: male     PCP: Adi Tinajero MD    Chief Complaints:    1. Joint Pain.    HPI:    Joint Pain:          The patient complains of joint pain in his right hip.          The patient believes symptoms are injury related no.          The symptoms have been present for 2 weeks.          The symptoms are moderate.          Symptomatic treatment has included NSAIDs, icing site, elevating site.          Associated symptoms include swelling, redness.     ROS:     GENERAL/CONSTITUTIONAL: Lightheadedness denies.  Change in appetite denies.  Weight Change denies.      CARDIOVASCULAR: Irregular heartbeat denies.  Swelling in hands/feet denies.      RESPIRATORY: Shortness of breath denies.  Shortness of breath at rest denies.  Wheezing denies.      MUSCULOSKELETAL: Comments See HPI for details.      NEUROLOGIC: Dizziness denies.  Fainting denies.  Headache denies.        Medical History:     Allergies   Allergen Reactions    Pcn [Penicillins]      Edema         Current Outpatient Medications   Medication Sig Note Dispense Refill    LORazepam (ATIVAN) 1 MG tablet TAKE 1 TABLET BY MOUTH DAILY. MAX DAILY AMOUNT: 1 MG  90 tablet 0    finasteride (PROSCAR) 5 MG tablet TAKE 1 TABLET BY MOUTH DAILY  100 tablet 0    tamsulosin (FLOMAX) 0.4 MG capsule TAKE 1 CAPSULE BY MOUTH DAILY  100 capsule 0    metoprolol succinate (TOPROL XL) 50 MG extended release tablet TAKE 1 TABLET BY MOUTH TWICE  DAILY  200 tablet 2    omeprazole (PRILOSEC) 20 MG delayed release capsule TAKE 1 CAPSULE BY MOUTH DAILY  100 capsule 2    Handicap Placard MISC by Does not apply route DXN: Lumbar DDD/spinal stenosis through 2029  1 each 0    furosemide (LASIX) 20 MG tablet TAKE 1 TABLET BY MOUTH DAILY  100 tablet 2    allopurinol

## 2024-07-31 NOTE — PROGRESS NOTES
After obtaining consent, and per orders of Dr. Duke, injection of Depo Medrol 80mg given in Right upper quad. gluteus by Ayleen Nova MA. ABN was signed prior to injection, copy was given to the patient. Patient tolerated the injection well.

## 2024-08-01 ENCOUNTER — HOSPITAL ENCOUNTER (OUTPATIENT)
Age: 74
Discharge: HOME | End: 2024-08-03
Payer: MEDICARE

## 2024-08-01 ENCOUNTER — HOSPITAL ENCOUNTER (OUTPATIENT)
Dept: GENERAL RADIOLOGY | Age: 74
Discharge: HOME | End: 2024-08-03
Payer: MEDICARE

## 2024-08-01 DIAGNOSIS — M25.551 RIGHT HIP PAIN: ICD-10-CM

## 2024-08-01 PROCEDURE — 73502 X-RAY EXAM HIP UNI 2-3 VIEWS: CPT

## 2024-08-05 DIAGNOSIS — S76.011A HIP STRAIN, RIGHT, INITIAL ENCOUNTER: ICD-10-CM

## 2024-08-05 DIAGNOSIS — S39.012A LUMBAR STRAIN, INITIAL ENCOUNTER: Primary | ICD-10-CM

## 2024-08-06 ENCOUNTER — TELEPHONE (OUTPATIENT)
Age: 74
End: 2024-08-06

## 2024-08-06 DIAGNOSIS — M25.551 RIGHT HIP PAIN: Primary | ICD-10-CM

## 2024-08-06 NOTE — TELEPHONE ENCOUNTER
Writer spoke to pt and he was notified about medication. He scheduled his year follow up to discuss

## 2024-08-06 NOTE — TELEPHONE ENCOUNTER
----- Message from Chu Blackwell MD sent at 8/6/2024  1:30 PM EDT -----  Please let patient know I spoke with his nephrologist Dr. Zhou and he said it was OK to use Viagra or Cialis for his ED.  If he would like to try a medication for this, let us know and where to prescribe it.  Pramod Blackwell M.D.   ----- Message -----  From: Samy Zhou MD  Sent: 8/6/2024   1:14 PM EDT  To: MD Pramod Newberry  I do not recall having a conversation to that effect  I see no reason why he cannot take meds for his ED just because he has CKD  Thanks  Abelardo  ----- Message -----  From: Chu Blackwell MD  Sent: 8/17/2023   6:33 PM EDT  To: MD Samy Castellanos,  Our mutual patient, Donnie Vicente mentioned during today's visit that you told him to avoid Rx with Viagra or Cialis for his ED due to his chronic renal disease.  I just wanted to follow up and confirm your objection to trying either med again for his ED.  The patient may have misunderstood.  Thanks!  Pramod

## 2024-09-05 ENCOUNTER — HOSPITAL ENCOUNTER (OUTPATIENT)
Age: 74
Setting detail: SPECIMEN
Discharge: HOME | End: 2024-09-05

## 2024-09-05 DIAGNOSIS — I10 ESSENTIAL HYPERTENSION, BENIGN: ICD-10-CM

## 2024-09-05 DIAGNOSIS — J41.0 SIMPLE CHRONIC BRONCHITIS (HCC): ICD-10-CM

## 2024-09-05 DIAGNOSIS — N18.31 STAGE 3A CHRONIC KIDNEY DISEASE (HCC): ICD-10-CM

## 2024-09-05 DIAGNOSIS — E55.9 HYPOVITAMINOSIS D: ICD-10-CM

## 2024-09-05 DIAGNOSIS — N18.32 STAGE 3B CHRONIC KIDNEY DISEASE (HCC): ICD-10-CM

## 2024-09-05 DIAGNOSIS — I38 HEART VALVE DISEASE: ICD-10-CM

## 2024-09-05 DIAGNOSIS — N28.1 ACQUIRED CYST OF KIDNEY: ICD-10-CM

## 2024-09-05 LAB
25(OH)D3 SERPL-MCNC: 50.3 NG/ML (ref 30–100)
25(OH)D3 SERPL-MCNC: 55.4 NG/ML (ref 30–100)
ALBUMIN SERPL-MCNC: 4.4 G/DL (ref 3.5–5.2)
ALBUMIN/GLOB SERPL: 2 {RATIO} (ref 1–2.5)
ALP SERPL-CCNC: 66 U/L (ref 40–129)
ALT SERPL-CCNC: 24 U/L (ref 10–50)
ANION GAP SERPL CALCULATED.3IONS-SCNC: 10 MMOL/L (ref 9–16)
ANION GAP SERPL CALCULATED.3IONS-SCNC: 10 MMOL/L (ref 9–16)
AST SERPL-CCNC: 24 U/L (ref 10–50)
BASOPHILS # BLD: 0.07 K/UL (ref 0–0.2)
BASOPHILS # BLD: 0.07 K/UL (ref 0–0.2)
BASOPHILS NFR BLD: 1 % (ref 0–2)
BASOPHILS NFR BLD: 1 % (ref 0–2)
BILIRUB SERPL-MCNC: 0.7 MG/DL (ref 0–1.2)
BUN SERPL-MCNC: 28 MG/DL (ref 8–23)
BUN SERPL-MCNC: 28 MG/DL (ref 8–23)
CALCIUM SERPL-MCNC: 9.6 MG/DL (ref 8.6–10.4)
CALCIUM SERPL-MCNC: 9.8 MG/DL (ref 8.6–10.4)
CHLORIDE SERPL-SCNC: 102 MMOL/L (ref 98–107)
CHLORIDE SERPL-SCNC: 102 MMOL/L (ref 98–107)
CHOLEST SERPL-MCNC: 126 MG/DL (ref 0–199)
CHOLESTEROL/HDL RATIO: 3
CO2 SERPL-SCNC: 26 MMOL/L (ref 20–31)
CO2 SERPL-SCNC: 27 MMOL/L (ref 20–31)
CREAT SERPL-MCNC: 1.6 MG/DL (ref 0.7–1.2)
CREAT SERPL-MCNC: 1.6 MG/DL (ref 0.7–1.2)
CREAT UR-MCNC: 68 MG/DL (ref 39–259)
EOSINOPHIL # BLD: 0.2 K/UL (ref 0–0.44)
EOSINOPHIL # BLD: 0.2 K/UL (ref 0–0.44)
EOSINOPHILS RELATIVE PERCENT: 3 % (ref 1–4)
EOSINOPHILS RELATIVE PERCENT: 3 % (ref 1–4)
ERYTHROCYTE [DISTWIDTH] IN BLOOD BY AUTOMATED COUNT: 13.9 % (ref 11.8–14.4)
ERYTHROCYTE [DISTWIDTH] IN BLOOD BY AUTOMATED COUNT: 14 % (ref 11.8–14.4)
GFR, ESTIMATED: 45 ML/MIN/1.73M2
GFR, ESTIMATED: 46 ML/MIN/1.73M2
GLUCOSE SERPL-MCNC: 83 MG/DL (ref 74–99)
GLUCOSE SERPL-MCNC: 84 MG/DL (ref 74–99)
HCT VFR BLD AUTO: 42.6 % (ref 40.7–50.3)
HCT VFR BLD AUTO: 42.7 % (ref 40.7–50.3)
HDLC SERPL-MCNC: 36 MG/DL
HGB BLD-MCNC: 13.5 G/DL (ref 13–17)
HGB BLD-MCNC: 13.7 G/DL (ref 13–17)
IMM GRANULOCYTES # BLD AUTO: <0.03 K/UL (ref 0–0.3)
IMM GRANULOCYTES # BLD AUTO: <0.03 K/UL (ref 0–0.3)
IMM GRANULOCYTES NFR BLD: 0 %
IMM GRANULOCYTES NFR BLD: 0 %
LDLC SERPL CALC-MCNC: 56 MG/DL (ref 0–100)
LYMPHOCYTES NFR BLD: 1.62 K/UL (ref 1.1–3.7)
LYMPHOCYTES NFR BLD: 1.71 K/UL (ref 1.1–3.7)
LYMPHOCYTES RELATIVE PERCENT: 22 % (ref 24–43)
LYMPHOCYTES RELATIVE PERCENT: 24 % (ref 24–43)
MAGNESIUM SERPL-MCNC: 2.1 MG/DL (ref 1.6–2.4)
MCH RBC QN AUTO: 29.5 PG (ref 25.2–33.5)
MCH RBC QN AUTO: 30 PG (ref 25.2–33.5)
MCHC RBC AUTO-ENTMCNC: 31.7 G/DL (ref 28.4–34.8)
MCHC RBC AUTO-ENTMCNC: 32.1 G/DL (ref 28.4–34.8)
MCV RBC AUTO: 93.2 FL (ref 82.6–102.9)
MCV RBC AUTO: 93.4 FL (ref 82.6–102.9)
MONOCYTES NFR BLD: 0.65 K/UL (ref 0.1–1.2)
MONOCYTES NFR BLD: 0.7 K/UL (ref 0.1–1.2)
MONOCYTES NFR BLD: 10 % (ref 3–12)
MONOCYTES NFR BLD: 9 % (ref 3–12)
NEUTROPHILS NFR BLD: 63 % (ref 36–65)
NEUTROPHILS NFR BLD: 64 % (ref 36–65)
NEUTS SEG NFR BLD: 4.61 K/UL (ref 1.5–8.1)
NEUTS SEG NFR BLD: 4.66 K/UL (ref 1.5–8.1)
NRBC BLD-RTO: 0 PER 100 WBC
NRBC BLD-RTO: 0 PER 100 WBC
PHOSPHATE SERPL-MCNC: 3.3 MG/DL (ref 2.5–4.5)
PLATELET # BLD AUTO: 325 K/UL (ref 138–453)
PLATELET # BLD AUTO: 330 K/UL (ref 138–453)
PMV BLD AUTO: 9.3 FL (ref 8.1–13.5)
PMV BLD AUTO: 9.4 FL (ref 8.1–13.5)
POTASSIUM SERPL-SCNC: 4.9 MMOL/L (ref 3.7–5.3)
POTASSIUM SERPL-SCNC: 5 MMOL/L (ref 3.7–5.3)
PROT SERPL-MCNC: 7.2 G/DL (ref 6.6–8.7)
RBC # BLD AUTO: 4.57 M/UL (ref 4.21–5.77)
RBC # BLD AUTO: 4.57 M/UL (ref 4.21–5.77)
SODIUM SERPL-SCNC: 138 MMOL/L (ref 136–145)
SODIUM SERPL-SCNC: 139 MMOL/L (ref 136–145)
TOTAL PROTEIN, URINE: 7 MG/DL
TRIGL SERPL-MCNC: 169 MG/DL
VLDLC SERPL CALC-MCNC: 34 MG/DL
WBC OTHER # BLD: 7.3 K/UL (ref 3.5–11.3)
WBC OTHER # BLD: 7.3 K/UL (ref 3.5–11.3)

## 2024-09-06 LAB — PTH-INTACT SERPL-MCNC: 63 PG/ML (ref 15–65)

## 2024-09-17 ENCOUNTER — OFFICE VISIT (OUTPATIENT)
Age: 74
End: 2024-09-17

## 2024-09-17 VITALS
WEIGHT: 203.6 LBS | BODY MASS INDEX: 29.15 KG/M2 | TEMPERATURE: 97.9 F | SYSTOLIC BLOOD PRESSURE: 114 MMHG | OXYGEN SATURATION: 97 % | HEIGHT: 70 IN | DIASTOLIC BLOOD PRESSURE: 70 MMHG | HEART RATE: 83 BPM

## 2024-09-17 DIAGNOSIS — F41.9 ANXIETY: ICD-10-CM

## 2024-09-17 DIAGNOSIS — Z95.2 HISTORY OF ARTIFICIAL HEART VALVE: ICD-10-CM

## 2024-09-17 DIAGNOSIS — I71.9 AORTIC ANEURYSM WITHOUT RUPTURE, UNSPECIFIED PORTION OF AORTA (HCC): ICD-10-CM

## 2024-09-17 DIAGNOSIS — M51.36 DDD (DEGENERATIVE DISC DISEASE), LUMBAR: Primary | ICD-10-CM

## 2024-09-17 DIAGNOSIS — N18.32 STAGE 3B CHRONIC KIDNEY DISEASE (HCC): ICD-10-CM

## 2024-09-17 RX ORDER — LORAZEPAM 1 MG/1
1 TABLET ORAL NIGHTLY
Qty: 90 TABLET | Refills: 1 | Status: SHIPPED | OUTPATIENT
Start: 2024-09-17 | End: 2025-03-16

## 2024-09-17 RX ORDER — METHYLPREDNISOLONE ACETATE 80 MG/ML
80 INJECTION, SUSPENSION INTRA-ARTICULAR; INTRALESIONAL; INTRAMUSCULAR; SOFT TISSUE ONCE
Status: COMPLETED | OUTPATIENT
Start: 2024-09-17 | End: 2024-09-17

## 2024-09-17 RX ORDER — HYDROCODONE BITARTRATE AND ACETAMINOPHEN 5; 325 MG/1; MG/1
1 TABLET ORAL 2 TIMES DAILY PRN
Qty: 60 TABLET | Refills: 0 | Status: SHIPPED | OUTPATIENT
Start: 2024-09-17 | End: 2024-10-17

## 2024-09-17 RX ADMIN — METHYLPREDNISOLONE ACETATE 80 MG: 80 INJECTION, SUSPENSION INTRA-ARTICULAR; INTRALESIONAL; INTRAMUSCULAR; SOFT TISSUE at 14:01

## 2024-09-17 SDOH — ECONOMIC STABILITY: INCOME INSECURITY: HOW HARD IS IT FOR YOU TO PAY FOR THE VERY BASICS LIKE FOOD, HOUSING, MEDICAL CARE, AND HEATING?: NOT HARD AT ALL

## 2024-09-17 SDOH — ECONOMIC STABILITY: FOOD INSECURITY: WITHIN THE PAST 12 MONTHS, THE FOOD YOU BOUGHT JUST DIDN'T LAST AND YOU DIDN'T HAVE MONEY TO GET MORE.: NEVER TRUE

## 2024-09-17 SDOH — ECONOMIC STABILITY: FOOD INSECURITY: WITHIN THE PAST 12 MONTHS, YOU WORRIED THAT YOUR FOOD WOULD RUN OUT BEFORE YOU GOT MONEY TO BUY MORE.: NEVER TRUE

## 2024-09-18 ENCOUNTER — HOSPITAL ENCOUNTER (OUTPATIENT)
Age: 74
Setting detail: THERAPIES SERIES
Discharge: HOME | End: 2024-09-18
Payer: MEDICARE

## 2024-09-18 PROCEDURE — 97162 PT EVAL MOD COMPLEX 30 MIN: CPT

## 2024-09-18 PROCEDURE — 97110 THERAPEUTIC EXERCISES: CPT

## 2024-09-20 ASSESSMENT — ENCOUNTER SYMPTOMS
CHEST TIGHTNESS: 0
SHORTNESS OF BREATH: 0

## 2024-09-30 ENCOUNTER — HOSPITAL ENCOUNTER (OUTPATIENT)
Age: 74
Setting detail: THERAPIES SERIES
Discharge: HOME | End: 2024-09-30
Payer: MEDICARE

## 2024-09-30 PROCEDURE — 97110 THERAPEUTIC EXERCISES: CPT

## 2024-09-30 RX ORDER — EZETIMIBE AND SIMVASTATIN 10; 20 MG/1; MG/1
TABLET ORAL
Qty: 100 TABLET | Refills: 2 | Status: SHIPPED | OUTPATIENT
Start: 2024-09-30

## 2024-09-30 NOTE — FLOWSHEET NOTE
Treatment Charges: Mins Units   []  Modalities     [x]  Ther Exercise 45 3   []  Manual Therapy     []  Ther Activities     []  Neuro Re-ed     []  Vasocompression     [] Gait     []  Other     Total billable time 45 3       Assessment: [x] Progressing toward goals.    [] No change.     [] Other:  [x] Patient would continue to benefit from skilled physical therapy services in order to work on hip and trunk stabilization, work on foot and ankle muscle strength/stabilization for external arch support, and work on general hip and back mobility.     STG: (to be met in 8 treatments)  ? Pain: improved pain levels to 1-2/10 to help with general function  ? ROM: improved DF to 15 degrees, inversion to 30 degrees to help with general foot mobility, improved hip mobility to SLR 70 degrees, ER 60 degrees to help with functional mobility such as tying shoes, improved lumbar flexion to 2/3rds distal form patella to ankles to help with tying shoes  ? Strength: 4/5 hip ER, abduction, able to do (B) bridge without pain/difficulty  ? Function: improved tolerance of standing up to 25-30 minutes to help with meal prep, improved tolerance of walking up to 10 minutes to allow better tolerance of grocery shopping due to decreased hip and ankle pain  Independent with Home Exercise Programs     LTG: (to be met in 12 treatments)  ? Pain: improved pain levels to 0-1/10 to help with general function  ? ROM: improved DF to 15 degrees, inversion to 30 degrees to help with general foot mobility, improved hip mobility to SLR 70 degrees, ER 65 degrees to help with functional mobility such as tying shoes, improved lumbar flexion to ankles to help with tying shoes  ? Strength: 4+/5 hip ER, abduction, able to do SL bridge without pain/difficulty  ? Function: improved tolerance of standing up to 45 minutes to help with meal prep, improved tolerance of walking up to 20 minutes to allow better tolerance of grocery shopping due to decreased hip and

## 2024-09-30 NOTE — TELEPHONE ENCOUNTER
Donnie Vicente is calling to request a refill on the following medication(s):    Medication Request:  Requested Prescriptions     Pending Prescriptions Disp Refills    ezetimibe-simvastatin (VYTORIN) 10-20 MG per tablet [Pharmacy Med Name: EZETIMIB/SIMVASTATIN 10-20MG TABLET] 100 tablet 2     Sig: TAKE 1 TABLET BY MOUTH DAILY AS  DIRECTED       Last Visit Date (If Applicable):  9/17/2024    Next Visit Date:    3/17/2025

## 2024-10-03 ENCOUNTER — HOSPITAL ENCOUNTER (OUTPATIENT)
Age: 74
Setting detail: THERAPIES SERIES
Discharge: HOME | End: 2024-10-03
Payer: MEDICARE

## 2024-10-03 PROCEDURE — 97110 THERAPEUTIC EXERCISES: CPT

## 2024-10-03 NOTE — FLOWSHEET NOTE
each 5#  For core strengthening, v/cues for active core stabilization   Other:    Response to treatment:  Pt tolerated session fairly well, objectives as documented above.  Progressed strengthening activities today with good tolerance. Mo significant changes in right low back or hip soreness.     Treatment Charges: Mins Units   []  Modalities     [x]  Ther Exercise 45 3   []  Manual Therapy     []  Ther Activities     []  Neuro Re-ed     []  Vasocompression     [] Gait     []  Other     Total billable time 45 3       Assessment: [x] Progressing toward goals.    [] No change.     [] Other:  [x] Patient would continue to benefit from skilled physical therapy services in order to work on hip and trunk stabilization, work on foot and ankle muscle strength/stabilization for external arch support, and work on general hip and back mobility.     STG: (to be met in 8 treatments)  ? Pain: improved pain levels to 1-2/10 to help with general function  ? ROM: improved DF to 15 degrees, inversion to 30 degrees to help with general foot mobility, improved hip mobility to SLR 70 degrees, ER 60 degrees to help with functional mobility such as tying shoes, improved lumbar flexion to 2/3rds distal form patella to ankles to help with tying shoes  ? Strength: 4/5 hip ER, abduction, able to do (B) bridge without pain/difficulty  ? Function: improved tolerance of standing up to 25-30 minutes to help with meal prep, improved tolerance of walking up to 10 minutes to allow better tolerance of grocery shopping due to decreased hip and ankle pain  Independent with Home Exercise Programs     LTG: (to be met in 12 treatments)  ? Pain: improved pain levels to 0-1/10 to help with general function  ? ROM: improved DF to 15 degrees, inversion to 30 degrees to help with general foot mobility, improved hip mobility to SLR 70 degrees, ER 65 degrees to help with functional mobility such as tying shoes, improved lumbar flexion to ankles to help with

## 2024-10-07 ENCOUNTER — HOSPITAL ENCOUNTER (OUTPATIENT)
Age: 74
Setting detail: THERAPIES SERIES
Discharge: HOME | End: 2024-10-07
Payer: MEDICARE

## 2024-10-07 PROCEDURE — 97110 THERAPEUTIC EXERCISES: CPT

## 2024-10-07 NOTE — FLOWSHEET NOTE
[facility]  [address], [address]  [facility] [address]  [phone]  F: [phone]     Physical Therapy Daily Treatment Note    Date:  10/7/2024  Patient Name:  Donnie Vicente    :  1950  MRN: [id number]  Physician: Garfield Duke MD                              Insurance: [employer] MEDICARE 12 visits approved 24 to 24  Medical Diagnosis: lumbar strain initial encounter S39.012A; hip strain (R) S76.011A; ADD (R) ankle pain M25.571                        Rehab Codes: low back pain M54.5; hip pain M25.551; (R) ankle pain M25.571    Onset Date: referral 24               Next 's appt.: PRN    Visit Count:                                 Cancel/No Show: 0/0    Subjective:    Pain:  [x] Yes  [] No Location: right lateral hip, right ankle   Pain Rating: (0-10 scale) 2-3/10  Pain altered Tx:  [x] No  [] Yes  Action:    Comments:  Pt reports some low-intensity right low back/hip and foot discomfort today.  States that his sx have been pretty stable since his last PT session.     Objective:  sx resolve in sitting, supine.  Tightness right hip rotators and piriformis.  Minimal right lateral hip soreness with palpation to greater trochanter. Pain with right ankle IR/supination, ambulates with increased right hip ER and eversion.     Today’s Treatment:    Modalities: N/A    Precautions: N/A    Exercise Reps/ Time Weight/ Level Comments   SciFit 6 min  L2    SKTC 4 x 15\"    bilaterally   piriformis 4 x 15\"    bilaterally    Supine hamstring stretch 4 x 15\"    bilaterally    Supine hip ER/IR stretch 4 x 15\" manually Bilaterally    LTR  x 15       LE fallouts  x 10    Bilaterally   Hooklying marching  X 15   Bilaterally   Resisted hip ADD  5\" x 15  Toy ball   Seated hip ER/IR  X 15 each 5# No hip sx provocation.   LAQ 2 x 10 4#    Resisted ham curls 2 x 10  5# Focus on neutral hip rotation   Ankle EV/IV X 20 ea  AA IV due to

## 2024-10-09 ENCOUNTER — OFFICE VISIT (OUTPATIENT)
Age: 74
End: 2024-10-09
Payer: MEDICARE

## 2024-10-09 DIAGNOSIS — Z85.528 PERSONAL HISTORY OF KIDNEY CANCER: ICD-10-CM

## 2024-10-09 DIAGNOSIS — R35.1 NOCTURIA: ICD-10-CM

## 2024-10-09 DIAGNOSIS — N40.1 BENIGN PROSTATIC HYPERPLASIA WITH URINARY OBSTRUCTION: Primary | ICD-10-CM

## 2024-10-09 DIAGNOSIS — N13.8 BENIGN PROSTATIC HYPERPLASIA WITH URINARY OBSTRUCTION: Primary | ICD-10-CM

## 2024-10-09 DIAGNOSIS — N52.8 OTHER MALE ERECTILE DYSFUNCTION: ICD-10-CM

## 2024-10-09 LAB
BILIRUBIN, POC: NORMAL
BLOOD URINE, POC: NORMAL
CLARITY, POC: CLEAR
COLOR, POC: YELLOW
GLUCOSE URINE, POC: NORMAL MG/DL
KETONES, POC: NORMAL
LEUKOCYTE EST, POC: NORMAL
NITRITE, POC: NORMAL
PH, POC: NORMAL
PROTEIN, POC: NORMAL MG/DL
SPECIFIC GRAVITY, POC: NORMAL
UROBILINOGEN, POC: NORMAL

## 2024-10-09 PROCEDURE — G8484 FLU IMMUNIZE NO ADMIN: HCPCS | Performed by: SPECIALIST

## 2024-10-09 PROCEDURE — 81003 URINALYSIS AUTO W/O SCOPE: CPT | Performed by: SPECIALIST

## 2024-10-09 PROCEDURE — 1123F ACP DISCUSS/DSCN MKR DOCD: CPT | Performed by: SPECIALIST

## 2024-10-09 PROCEDURE — 1036F TOBACCO NON-USER: CPT | Performed by: SPECIALIST

## 2024-10-09 PROCEDURE — G8427 DOCREV CUR MEDS BY ELIG CLIN: HCPCS | Performed by: SPECIALIST

## 2024-10-09 PROCEDURE — 3017F COLORECTAL CA SCREEN DOC REV: CPT | Performed by: SPECIALIST

## 2024-10-09 PROCEDURE — G8417 CALC BMI ABV UP PARAM F/U: HCPCS | Performed by: SPECIALIST

## 2024-10-09 PROCEDURE — 99214 OFFICE O/P EST MOD 30 MIN: CPT | Performed by: SPECIALIST

## 2024-10-09 RX ORDER — FINASTERIDE 5 MG/1
5 TABLET, FILM COATED ORAL DAILY
Qty: 100 TABLET | Refills: 3 | Status: SHIPPED | OUTPATIENT
Start: 2024-10-09

## 2024-10-09 RX ORDER — TAMSULOSIN HYDROCHLORIDE 0.4 MG/1
0.4 CAPSULE ORAL DAILY
Qty: 100 CAPSULE | Refills: 3 | Status: SHIPPED | OUTPATIENT
Start: 2024-10-09

## 2024-10-09 NOTE — PROGRESS NOTES
Chu Blackwell MD FACS    [facility] Urology Office Progress Note    Patient:  Donnie Vicente  YOB: 1950  Date: 10/9/2024    HISTORY OF PRESENT ILLNESS:   The patient is a 74 y.o. male  Patient's lower urinary tract symptoms are stable on Flomax/tamsulosin 0.4 mg po qd and Finasteride 5 mg po qd for BPH symptoms.    Patient has nocturia x 3 but he does not limit his nocturnal fluid consumption and this is not bothersome enough to warrant medical Rx.    Lower urinary tract symptoms: urgency, frequency, hesitancy, decreased urinary stream, and nocturia, 3 times per night   Last AUA Symptom Score (QOL): 7 (2)  Today's AUA Symptom Score (QOL): 8 (1)    Summary of old records:   Right kidney cancer s/p right open partial nephrectomy 6/24/14 and subsequent complete embolization for bleeding, Ta N0; 7/8/21 CT-atrophic right kidney and 5.4 cm left renal cyst  BPH on finasteride 5 mg qd  and tamsulosin 0.4 mg qd  Nx3 due to excess fluids  Right spermatocele 1.1 cm on 8/14/14 scrotal U/S  Long standing microhematuria  Left renal cyst 2.6 cm on 10/3/14 renal U/S; 5.4 cm on 7/8/21 CT  ED: Viagra 100 mg and Cialis 20 didn't work (nephrology may have issue with use); not interested in Trimix.     Additional History: none    Procedures Today: N/A    Urinalysis today:  Results for orders placed or performed in visit on 10/09/24   POCT Urinalysis No Micro (Auto)   Result Value Ref Range    Color, UA yellow     Clarity, UA clear     Glucose, UA POC neg mg/dL    Bilirubin, UA      Ketones, UA      Spec Grav, UA      Blood, UA POC neg     pH, UA      Protein, UA POC trace mg/dL    Urobilinogen, UA      Leukocytes, UA neg     Nitrite, UA neg        Last several PSA's:  Lab Results   Component Value Date    PSA 0.22 09/07/2023    PSA 0.16 07/22/2019    PSA 0.16 06/13/2017       Last total testosterone:  No results found for: \"TESTOSTERONE\"    Last BUN and creatinine:  Lab Results   Component Value Date

## 2024-10-10 ENCOUNTER — HOSPITAL ENCOUNTER (OUTPATIENT)
Age: 74
Setting detail: THERAPIES SERIES
Discharge: HOME | End: 2024-10-10
Payer: MEDICARE

## 2024-10-10 PROCEDURE — 97110 THERAPEUTIC EXERCISES: CPT

## 2024-10-10 NOTE — FLOWSHEET NOTE
for active core stabilization   Other:    Response to treatment:  Pt tolerated session well today, no provocation of right buttock sx throughout session. Progress strengthening activities today with good tolerance.     Treatment Charges: Mins Units   []  Modalities     [x]  Ther Exercise 45 3   []  Manual Therapy     []  Ther Activities     []  Neuro Re-ed     []  Vasocompression     [] Gait     []  Other     Total billable time 45 3       Assessment: [x] Progressing toward goals.    [] No change.     [] Other:  [x] Patient would continue to benefit from skilled physical therapy services in order to work on hip and trunk stabilization, work on foot and ankle muscle strength/stabilization for external arch support, and work on general hip and back mobility.     STG: (to be met in 8 treatments)  ? Pain: improved pain levels to 1-2/10 to help with general function  ? ROM: improved DF to 15 degrees, inversion to 30 degrees to help with general foot mobility, improved hip mobility to SLR 70 degrees, ER 60 degrees to help with functional mobility such as tying shoes, improved lumbar flexion to 2/3rds distal form patella to ankles to help with tying shoes  ? Strength: 4/5 hip ER, abduction, able to do (B) bridge without pain/difficulty  ? Function: improved tolerance of standing up to 25-30 minutes to help with meal prep, improved tolerance of walking up to 10 minutes to allow better tolerance of grocery shopping due to decreased hip and ankle pain  Independent with Home Exercise Programs     LTG: (to be met in 12 treatments)  ? Pain: improved pain levels to 0-1/10 to help with general function  ? ROM: improved DF to 15 degrees, inversion to 30 degrees to help with general foot mobility, improved hip mobility to SLR 70 degrees, ER 65 degrees to help with functional mobility such as tying shoes, improved lumbar flexion to ankles to help with tying shoes  ? Strength: 4+/5 hip ER, abduction, able to do SL bridge without

## 2024-10-14 ENCOUNTER — HOSPITAL ENCOUNTER (OUTPATIENT)
Age: 74
Setting detail: THERAPIES SERIES
Discharge: HOME | End: 2024-10-14
Payer: MEDICARE

## 2024-10-14 PROCEDURE — 97110 THERAPEUTIC EXERCISES: CPT

## 2024-10-14 NOTE — FLOWSHEET NOTE
[facility] Rehabilitation &  Therapy  [address], [address]  [place] [address]  [phone]  F: [phone]     Physical Therapy Daily Treatment Note    Date:  10/14/2024  Patient Name:  Donnie Vicente    :  1950  MRN: [id number]  Physician: Garfield Duke MD                              Insurance: [employer] MEDICARE 12 visits approved 24 to 24  Medical Diagnosis: lumbar strain initial encounter S39.012A; hip strain (R) S76.011A; ADD (R) ankle pain M25.571                        Rehab Codes: low back pain M54.5; hip pain M25.551; (R) ankle pain M25.571    Onset Date: referral 24               Next 's appt.: PRN    Visit Count:                                 Cancel/No Show: 0/0    Subjective:    Pain:  [x] Yes  [] No Location: right lateral hip Pain Rating: (0-10 scale) 2/10  Pain altered Tx:  [x] No  [] Yes  Action:    Comments:  Pt reports right buttock soreness 2/10, denies radicular sx into LE's.  Pt states that he continues to obtain sx reduction after his PT sessions-tolerating increased activity levels outside of clinic without sx provocation.      Objective:  noted improvement in trunk and LE mobility with passive stretching, no pain provocation with stretching or  strengthening activities. Right ankle inversion remains weak.     Today’s Treatment:    Modalities: N/A    Precautions: N/A    Exercise Reps/ Time Weight/ Level Comments   SciFit 6 min  L2    SKTC 4 x 15\"    bilaterally   piriformis 4 x 15\"    bilaterally    Supine hamstring stretch 4 x 15\"    bilaterally    Supine hip ER/IR stretch 4 x 15\" manually Bilaterally    LTR  x 15    manual    LE fallouts  x 10    Bilaterally   Hooklying marching  X 15   Bilaterally   Resisted hip ADD  5\" x 15  Toy ball   Seated hip ER/IR  X 15 each 5# No hip sx provocation.   LAQ 2 x 10 4#    Resisted ham curls 2 x 10  5# Focus on neutral hip rotation   Ankle EV/IV X 20 ea  AA IV due to

## 2024-10-16 NOTE — FLOWSHEET NOTE
[facility] Rehabilitation &  Therapy  [address], [address]  [place] [address]  [phone]  F: [phone]     Physical Therapy Daily Treatment Note    Date:  10/16/2024  Patient Name:  Donnie Vicente    :  1950  MRN: [id number]  Physician: Garfield Duke MD                              Insurance: [employer] MEDICARE 12 visits approved 24 to 24  Medical Diagnosis: lumbar strain initial encounter S39.012A; hip strain (R) S76.011A; ADD (R) ankle pain M25.571                        Rehab Codes: low back pain M54.5; hip pain M25.551; (R) ankle pain M25.571    Onset Date: referral 24               Next 's appt.: PRN    Visit Count:                                 Cancel/No Show: 0/0    Subjective:    Pain:  [x] Yes  [] No Location: right lateral hip Pain Rating: (0-10 scale) 2/10  Pain altered Tx:  [x] No  [] Yes  Action:    Comments:  Pt reports continued right buttock soreness 2/10, denies radicular sx into LE's.  Sx continue to provoke slightly with transition out of bed in the morning and with periods of static sitting. Overall, pt feels his activity level has improved with less sx intensity.     Objective:  noted improvement in trunk and LE mobility with passive stretching, no pain provocation with stretching or  strengthening activities. Right ankle inversion remains weak.     Today’s Treatment:    Modalities: N/A    Precautions: N/A    Exercise Reps/ Time Weight/ Level Comments   SciFit 6 min  L2    SKTC 4 x 15\"    bilaterally   piriformis 4 x 15\"    bilaterally    Supine hamstring stretch 4 x 15\"    bilaterally    Supine hip ER/IR stretch 4 x 15\" manually Bilaterally    LTR  x 15    manual    LE fallouts  x 10    Bilaterally   Hooklying marching  X 15   Bilaterally   Resisted hip ADD  5\" x 15  Toy ball   Seated hip ER/IR  X 15 each 5# No hip sx provocation.   LAQ 2 x 10 4#    Resisted ham curls 2 x 10  5# Focus on neutral hip rotation   Ankle EV/IV

## 2024-10-17 ENCOUNTER — HOSPITAL ENCOUNTER (OUTPATIENT)
Age: 74
Setting detail: THERAPIES SERIES
Discharge: HOME | End: 2024-10-17
Payer: MEDICARE

## 2024-10-17 PROCEDURE — 97110 THERAPEUTIC EXERCISES: CPT

## 2024-10-20 NOTE — FLOWSHEET NOTE
0-1/10 to help with general function  ? ROM: improved DF to 15 degrees, inversion to 30 degrees to help with general foot mobility, improved hip mobility to SLR 70 degrees, ER 65 degrees to help with functional mobility such as tying shoes, improved lumbar flexion to ankles to help with tying shoes  ? Strength: 4+/5 hip ER, abduction, able to do SL bridge without pain/difficulty  ? Function: improved tolerance of standing up to 45 minutes to help with meal prep, improved tolerance of walking up to 20 minutes to allow better tolerance of grocery shopping due to decreased hip and ankle pain  Independent with Home Exercise Programs     Patient goals:none listed- grossly function- walking better    Pt. Education:  [x] Yes  [] No  [x] Reviewed Prior HEP/Ed  Method of Education: [x] Verbal  [] Demo  [x] Written  Comprehension of Education:  [x] Verbalizes understanding.  [] Demonstrates understanding.  [] Needs review.  [] Demonstrates/verbalizes HEP/Ed previously given.     Plan: [x] Continue current frequency toward long and short term goals.    [x] Specific Instructions for subsequent treatments: progress arch stabilization program, progress lateral hip and core strengthening                                            Frequency:  2-3 x/week for 12 visits     Time In: 1415     Time Out: 1500    Electronically signed by:  Robin Awad PTA

## 2024-10-21 ENCOUNTER — HOSPITAL ENCOUNTER (OUTPATIENT)
Age: 74
Setting detail: THERAPIES SERIES
Discharge: HOME | End: 2024-10-21
Payer: MEDICARE

## 2024-10-21 PROCEDURE — 97110 THERAPEUTIC EXERCISES: CPT

## 2024-10-23 NOTE — FLOWSHEET NOTE
[facility] Rehabilitation &  Therapy  [address], [address]  [place] [address]  [phone]  F: [phone]     Physical Therapy Daily Treatment Note    Date:  10/23/2024  Patient Name:  Donnie Vicente    :  1950  MRN: [id number]  Physician: Garfield Duke MD                              Insurance: [employer] MEDICARE 12 visits approved 24 to 24  Medical Diagnosis: lumbar strain initial encounter S39.012A; hip strain (R) S76.011A; ADD (R) ankle pain M25.571                        Rehab Codes: low back pain M54.5; hip pain M25.551; (R) ankle pain M25.571    Onset Date: referral 24               Next 's appt.: PRN    Visit Count:                                 Cancel/No Show: 0/0    Subjective:    Pain:  [x] Yes  [] No Location: right lateral hip Pain Rating: (0-10 scale) 1-2/10  Pain altered Tx:  [x] No  [] Yes  Action:    Comments:  Pt reports continued right buttock soreness 1-2/10, denies radicular sx into LE's. Sx are no longer provoking with daily activities.      Objective:  Ongoing: noted improvement in trunk and LE mobility with passive stretching, no pain provocation with stretching or  strengthening activities. Right ankle inversion remains weak.     Today’s Treatment:    Modalities: N/A    Precautions: N/A    Exercise Reps/ Time Weight/ Level Comments   SciFit 6 min  L2    SKTC 4 x 15\"    bilaterally   piriformis 4 x 15\"    bilaterally    Supine hamstring stretch 4 x 15\"    bilaterally    Supine hip ER/IR stretch 4 x 15\" manually Bilaterally    LTR  x 15    manual    LE fallouts  x 10    Bilaterally   Hooklying marching  X 15   Bilaterally   Resisted hip ADD  5\" x 15  Toy ball   Resisted hip ABD 5\" x 15   Red loop   Seated hip ER/IR  X 15 each 5# No hip sx provocation.   LAQ 2 x 10 4#    Resisted ham curls 2 x 10  5# Focus on neutral hip rotation   Ankle EV/IV X 20 ea  AA IV due to weakness/tightness.    Passive right hip ROM X 15 each   
192

## 2024-10-24 ENCOUNTER — HOSPITAL ENCOUNTER (OUTPATIENT)
Age: 74
Setting detail: THERAPIES SERIES
Discharge: HOME | End: 2024-10-24
Payer: MEDICARE

## 2024-10-24 PROCEDURE — 97110 THERAPEUTIC EXERCISES: CPT

## 2024-10-29 ENCOUNTER — APPOINTMENT (OUTPATIENT)
Age: 74
End: 2024-10-29
Payer: MEDICARE

## 2024-10-30 ENCOUNTER — HOSPITAL ENCOUNTER (OUTPATIENT)
Age: 74
Setting detail: THERAPIES SERIES
Discharge: HOME | End: 2024-10-30
Payer: MEDICARE

## 2024-10-30 PROCEDURE — 97110 THERAPEUTIC EXERCISES: CPT

## 2024-10-30 NOTE — PROGRESS NOTES
stretch 4 x 15\"    bilaterally    Supine hip ER/IR stretch 4 x 15\" manually Bilaterally    LTR  x 15    manual    LE fallouts  x 10    Bilaterally   Hooklying marching  X 15   Bilaterally   Resisted hip ADD  5\" x 15  Toy ball   Resisted hip ABD 5\" x 15   Red loop   Seated hip ER/IR  X 15 each 5# No hip sx provocation.   LAQ 2 x 10 4#    Resisted ham curls 2 x 10  5# Focus on neutral hip rotation   Ankle EV/IV X 20 ea  AA IV due to weakness/tightness.    Passive right hip ROM X 15 each     3-level rows  Shoulder extension  X 15 each 8#  For core strengthening, v/cues for active core stabilization         Towel scrunch   1 x through  seated   [place]    2 x through  seated   Other:    Response to treatment:  Objective measures as documented above. No adverse reaction to exercises. Pt feels he has made progression with exercises and stretches for the lower back. Complaint with HEP     Treatment Charges: Mins Units   []  Modalities     [x]  Ther Exercise 45 3   []  Manual Therapy     []  Ther Activities     []  Neuro Re-ed     []  Vasocompression     [] Gait     []  Other     Total billable time 45 3       Assessment: [x] Progressing toward goals. Implemented intrinsic strength to the R foot without adverse reaction.    [] No change.     [] Other:  [x] Patient would continue to benefit from skilled physical therapy services in order to work on hip and trunk stabilization, work on foot and ankle muscle strength/stabilization for external arch support, and work on general hip and back mobility.     STG: (to be met in 8 treatments)  Addressed 10/30/24  ? Pain: improved pain levels to 1-2/10 to help with general function  MET  ? ROM: improved DF to 15 degrees, inversion to 30 degrees to help with general foot mobility, improved hip mobility to SLR 70 degrees, ER 60 degrees to help with functional mobility such as tying shoes, improved lumbar flexion to 2/3rds distal form patella to ankles to help with tying shoes MET

## 2024-11-03 NOTE — FLOWSHEET NOTE
[facility] Rehabilitation &  Therapy  [address]  [place] [address]  [phone]  F: [phone]     Physical Therapy Daily Treatment Note    Date:  11/3/2024  Patient Name:  Donnie Vicente    :  1950  MRN: [id number]  Physician: Garfield Duke MD                              Insurance: [employer] MEDICARE 12 visits approved 24 to 24  Medical Diagnosis: lumbar strain initial encounter S39.012A; hip strain (R) S76.011A; ADD (R) ankle pain M25.571                        Rehab Codes: low back pain M54.5; hip pain M25.551; (R) ankle pain M25.571    Onset Date: referral 24               Next 's appt.: PRN    Visit Count:                                 Cancel/No Show: 0/0    Subjective:   Pain:  [x] Yes  [] No Location: right lateral hip/R ankle Pain Rating: (0-10 scale) 1-2/10  Pain altered Tx:  [x] No  [] Yes  Action:    Comments: Pt reports minimal sx but states he continues to have some right foot discomfort if he is up on his feet for \"too long\"      Objective: Pt demonstrates improved right ankle active DF but does not achieve full ROM.     Documented on progress note:  ROM:  Hip mobility   SLR (R) 70 degrees   ER (R) 55 degrees lateral hip discomfort, (L) 60 degrees  IR (B) 30 degrees     Lumbar mobility  Flexion 1/2 distal from patella to ankles  Extension 15 degrees     Repeated movement testing did not reproduce symptoms- includes flexion in standing, extension in standing, flexion in lying.     Ankle mobility:  DF (R) 10 degrees, (L) 15 degrees  PF (B) 55 degrees  Inversion (R) 20 degrees with lateral ankle discomfort, (L) 45 degrees  Eversion 10 degrees (B)    Oswestry 16/50 = 32% IMPAIRMENT       Today’s Treatment:    Modalities: N/A    Precautions: N/A    Exercise Reps/ Time Weight/ Level Comments   SciFit 6 min  L2    SKTC 4 x 15\"    bilaterally   piriformis 4 x 15\"    bilaterally    Supine hamstring stretch 4 x 15\"    bilaterally

## 2024-11-04 ENCOUNTER — HOSPITAL ENCOUNTER (OUTPATIENT)
Age: 74
Setting detail: THERAPIES SERIES
Discharge: HOME | End: 2024-11-04
Payer: MEDICARE

## 2024-11-04 PROCEDURE — 97110 THERAPEUTIC EXERCISES: CPT

## 2024-11-05 RX ORDER — DILTIAZEM HYDROCHLORIDE 60 MG/1
TABLET, FILM COATED ORAL
Qty: 30.6 G | Refills: 3 | Status: SHIPPED | OUTPATIENT
Start: 2024-11-05

## 2024-11-05 NOTE — TELEPHONE ENCOUNTER
Donnie Vicente is calling to request a refill on the following medication(s):    Medication Request:  Requested Prescriptions     Pending Prescriptions Disp Refills    SYMBICORT 80-4.5 MCG/ACT AERO [Pharmacy Med Name: Symbicort 80-4.5 MCG/ACT Inhalation Aerosol] 30.6 g 3     Sig: USE 2 INHALATIONS BY MOUTH  TWICE DAILY       Last Visit Date (If Applicable):  9/17/2024    Next Visit Date:    11/5/2024

## 2024-11-06 ENCOUNTER — HOSPITAL ENCOUNTER (OUTPATIENT)
Age: 74
Setting detail: THERAPIES SERIES
Discharge: HOME | End: 2024-11-06
Payer: MEDICARE

## 2024-11-06 PROCEDURE — 97110 THERAPEUTIC EXERCISES: CPT

## 2024-11-06 NOTE — FLOWSHEET NOTE
to work on hip and trunk stabilization, work on foot and ankle muscle strength/stabilization for external arch support, and work on general hip and back mobility.     STG: (to be met in 8 treatments)  Addressed 11/6/24  ? Pain: improved pain levels to 1-2/10 to help with general function  MET  ? ROM: improved DF to 15 degrees, inversion to 30 degrees to help with general foot mobility, improved hip mobility to SLR 70 degrees, ER 60 degrees to help with functional mobility such as tying shoes, improved lumbar flexion to 2/3rds distal form patella to ankles to help with tying shoes MET for lumbar mobility, progressing for other directions  ? Strength: 4/5 hip ER, abduction, able to do (B) bridge without pain/difficulty progressing  ? Function: improved tolerance of standing up to 25-30 minutes to help with meal prep, improved tolerance of walking up to 10 minutes to allow better tolerance of grocery shopping due to decreased hip and ankle pain MET  Independent with Home Exercise Programs MET with current HEP     LTG: (to be met in 12 treatments) ADDRESSED 11/6/24  ? Pain: improved pain levels to 0-1/10 to help with general function MET  ? ROM: improved DF to 15 degrees PROGRESSING, inversion to 30 degrees to help with general foot mobility PROGRESSING, improved hip mobility to SLR 70 degrees MET, ER 65 degrees to help with functional mobility such as tying shoes PROGRESSING, improved lumbar flexion to ankles to help with tying shoes  ? Strength: 4+/5 hip ER, abduction MET, able to do SL bridge without pain/difficulty PROGRESSING  ? Function: improved tolerance of standing up to 45 minutes to help with meal prep PROGRESSING, improved tolerance of walking up to 20 minutes to allow better tolerance of grocery shopping due to decreased hip and ankle pain PROGRESSING  Independent with Home Exercise Programs MET     Patient goals:none listed- grossly function- walking better    Pt. Education:  [x] Yes  [] No  [x] Reviewed

## 2024-11-07 RX ORDER — ALLOPURINOL 100 MG/1
100 TABLET ORAL DAILY
Qty: 100 TABLET | Refills: 2 | Status: SHIPPED | OUTPATIENT
Start: 2024-11-07

## 2024-11-07 NOTE — TELEPHONE ENCOUNTER
Donnie Vicente is calling to request a refill on the following medication(s):    Medication Request:  Requested Prescriptions     Pending Prescriptions Disp Refills    allopurinol (ZYLOPRIM) 100 MG tablet [Pharmacy Med Name: Allopurinol 100 MG Oral Tablet] 100 tablet 2     Sig: TAKE 1 TABLET BY MOUTH ONCE  DAILY       Last Visit Date (If Applicable):  9/17/2024    Next Visit Date:    3/17/2025

## 2024-12-02 RX ORDER — EZETIMIBE AND SIMVASTATIN 10; 20 MG/1; MG/1
1 TABLET ORAL NIGHTLY
Qty: 100 TABLET | Refills: 2 | Status: SHIPPED | OUTPATIENT
Start: 2024-12-02

## 2024-12-02 NOTE — TELEPHONE ENCOUNTER
Patient was notified from [facility] that the Ezetimibe Simvastatin 10-20mg  is not available and need to send to his local pharmacy    - send [facility]

## 2025-02-17 ENCOUNTER — HOSPITAL ENCOUNTER (OUTPATIENT)
Age: 75
Setting detail: SPECIMEN
Discharge: HOME | End: 2025-02-17

## 2025-02-17 DIAGNOSIS — N18.32 STAGE 3B CHRONIC KIDNEY DISEASE (HCC): ICD-10-CM

## 2025-02-17 DIAGNOSIS — N28.1 ACQUIRED CYST OF KIDNEY: ICD-10-CM

## 2025-02-17 DIAGNOSIS — I10 ESSENTIAL HYPERTENSION, BENIGN: ICD-10-CM

## 2025-02-17 LAB
25(OH)D3 SERPL-MCNC: 48.5 NG/ML (ref 30–100)
ALBUMIN SERPL-MCNC: 4.1 G/DL (ref 3.5–5.2)
ANION GAP SERPL CALCULATED.3IONS-SCNC: 10 MMOL/L (ref 9–16)
BUN SERPL-MCNC: 24 MG/DL (ref 8–23)
CALCIUM SERPL-MCNC: 9.8 MG/DL (ref 8.6–10.4)
CHLORIDE SERPL-SCNC: 101 MMOL/L (ref 98–107)
CO2 SERPL-SCNC: 27 MMOL/L (ref 20–31)
CREAT SERPL-MCNC: 1.6 MG/DL (ref 0.7–1.2)
CREAT UR-MCNC: 76.9 MG/DL (ref 39–259)
ERYTHROCYTE [DISTWIDTH] IN BLOOD BY AUTOMATED COUNT: 12.9 % (ref 11.8–14.4)
GFR, ESTIMATED: 45 ML/MIN/1.73M2
GLUCOSE SERPL-MCNC: 96 MG/DL (ref 74–99)
HCT VFR BLD AUTO: 39.6 % (ref 40.7–50.3)
HGB BLD-MCNC: 12.4 G/DL (ref 13–17)
MCH RBC QN AUTO: 29 PG (ref 25.2–33.5)
MCHC RBC AUTO-ENTMCNC: 31.3 G/DL (ref 28.4–34.8)
MCV RBC AUTO: 92.5 FL (ref 82.6–102.9)
NRBC BLD-RTO: 0 PER 100 WBC
PHOSPHATE SERPL-MCNC: 3 MG/DL (ref 2.5–4.5)
PLATELET # BLD AUTO: 348 K/UL (ref 138–453)
PMV BLD AUTO: 9.3 FL (ref 8.1–13.5)
POTASSIUM SERPL-SCNC: 4.6 MMOL/L (ref 3.7–5.3)
PTH-INTACT SERPL-MCNC: 71 PG/ML (ref 15–65)
RBC # BLD AUTO: 4.28 M/UL (ref 4.21–5.77)
SODIUM SERPL-SCNC: 138 MMOL/L (ref 136–145)
TOTAL PROTEIN, URINE: 11 MG/DL
URINE TOTAL PROTEIN CREATININE RATIO: 0.14 (ref 0–0.2)
WBC OTHER # BLD: 6.9 K/UL (ref 3.5–11.3)

## 2025-02-27 DIAGNOSIS — F41.9 ANXIETY: ICD-10-CM

## 2025-02-27 RX ORDER — LORAZEPAM 1 MG/1
TABLET ORAL
Qty: 90 TABLET | Refills: 0 | Status: SHIPPED | OUTPATIENT
Start: 2025-02-27 | End: 2025-05-28

## 2025-02-27 NOTE — TELEPHONE ENCOUNTER
Donnie Vicente is calling to request a refill on the following medication(s):    Medication Request:  Requested Prescriptions     Pending Prescriptions Disp Refills    LORazepam (ATIVAN) 1 MG tablet [Pharmacy Med Name: LORAZEPAM 1MG TABLETS] 90 tablet      Sig: TAKE 1 TABLET BY MOUTH DAILY. MAX DAILY AMOUNT: 1 MG       Last Visit Date (If Applicable):  9/17/2024    Next Visit Date:    3/17/2025

## 2025-03-17 ENCOUNTER — OFFICE VISIT (OUTPATIENT)
Age: 75
End: 2025-03-17
Payer: MEDICARE

## 2025-03-17 VITALS
WEIGHT: 212 LBS | BODY MASS INDEX: 30.35 KG/M2 | DIASTOLIC BLOOD PRESSURE: 80 MMHG | HEART RATE: 81 BPM | OXYGEN SATURATION: 98 % | SYSTOLIC BLOOD PRESSURE: 130 MMHG | TEMPERATURE: 98 F | HEIGHT: 70 IN

## 2025-03-17 DIAGNOSIS — I38 HEART VALVE DISEASE: ICD-10-CM

## 2025-03-17 DIAGNOSIS — D64.9 ANEMIA, UNSPECIFIED TYPE: ICD-10-CM

## 2025-03-17 DIAGNOSIS — I71.9 AORTIC ANEURYSM WITHOUT RUPTURE, UNSPECIFIED PORTION OF AORTA: ICD-10-CM

## 2025-03-17 DIAGNOSIS — I10 ESSENTIAL HYPERTENSION, BENIGN: ICD-10-CM

## 2025-03-17 DIAGNOSIS — M51.362 DEGENERATION OF INTERVERTEBRAL DISC OF LUMBAR REGION WITH DISCOGENIC BACK PAIN AND LOWER EXTREMITY PAIN: ICD-10-CM

## 2025-03-17 DIAGNOSIS — Z00.00 ENCOUNTER FOR SUBSEQUENT ANNUAL WELLNESS VISIT (AWV) IN MEDICARE PATIENT: Primary | ICD-10-CM

## 2025-03-17 PROCEDURE — G8427 DOCREV CUR MEDS BY ELIG CLIN: HCPCS | Performed by: FAMILY MEDICINE

## 2025-03-17 PROCEDURE — 1036F TOBACCO NON-USER: CPT | Performed by: FAMILY MEDICINE

## 2025-03-17 PROCEDURE — 3079F DIAST BP 80-89 MM HG: CPT | Performed by: FAMILY MEDICINE

## 2025-03-17 PROCEDURE — G0439 PPPS, SUBSEQ VISIT: HCPCS | Performed by: FAMILY MEDICINE

## 2025-03-17 PROCEDURE — 3075F SYST BP GE 130 - 139MM HG: CPT | Performed by: FAMILY MEDICINE

## 2025-03-17 PROCEDURE — G8417 CALC BMI ABV UP PARAM F/U: HCPCS | Performed by: FAMILY MEDICINE

## 2025-03-17 PROCEDURE — 1159F MED LIST DOCD IN RCRD: CPT | Performed by: FAMILY MEDICINE

## 2025-03-17 PROCEDURE — 3017F COLORECTAL CA SCREEN DOC REV: CPT | Performed by: FAMILY MEDICINE

## 2025-03-17 PROCEDURE — 99214 OFFICE O/P EST MOD 30 MIN: CPT | Performed by: FAMILY MEDICINE

## 2025-03-17 PROCEDURE — 1123F ACP DISCUSS/DSCN MKR DOCD: CPT | Performed by: FAMILY MEDICINE

## 2025-03-17 RX ORDER — DONEPEZIL HYDROCHLORIDE 5 MG/1
5 TABLET, FILM COATED ORAL NIGHTLY
Qty: 30 TABLET | Refills: 3 | Status: SHIPPED | OUTPATIENT
Start: 2025-03-17

## 2025-03-17 RX ORDER — HYDROCODONE BITARTRATE AND ACETAMINOPHEN 5; 325 MG/1; MG/1
1 TABLET ORAL EVERY 6 HOURS PRN
COMMUNITY
End: 2025-03-17 | Stop reason: SDUPTHER

## 2025-03-17 RX ORDER — HYDROCODONE BITARTRATE AND ACETAMINOPHEN 5; 325 MG/1; MG/1
1 TABLET ORAL EVERY 6 HOURS PRN
Qty: 80 TABLET | Refills: 0 | Status: SHIPPED | OUTPATIENT
Start: 2025-03-17 | End: 2025-04-16

## 2025-03-17 SDOH — ECONOMIC STABILITY: FOOD INSECURITY: WITHIN THE PAST 12 MONTHS, THE FOOD YOU BOUGHT JUST DIDN'T LAST AND YOU DIDN'T HAVE MONEY TO GET MORE.: NEVER TRUE

## 2025-03-17 SDOH — ECONOMIC STABILITY: FOOD INSECURITY: WITHIN THE PAST 12 MONTHS, YOU WORRIED THAT YOUR FOOD WOULD RUN OUT BEFORE YOU GOT MONEY TO BUY MORE.: NEVER TRUE

## 2025-03-17 ASSESSMENT — PATIENT HEALTH QUESTIONNAIRE - PHQ9
2. FEELING DOWN, DEPRESSED OR HOPELESS: SEVERAL DAYS
SUM OF ALL RESPONSES TO PHQ QUESTIONS 1-9: 1
SUM OF ALL RESPONSES TO PHQ QUESTIONS 1-9: 1
1. LITTLE INTEREST OR PLEASURE IN DOING THINGS: NOT AT ALL
SUM OF ALL RESPONSES TO PHQ QUESTIONS 1-9: 1
SUM OF ALL RESPONSES TO PHQ QUESTIONS 1-9: 1

## 2025-03-17 ASSESSMENT — ENCOUNTER SYMPTOMS
SHORTNESS OF BREATH: 0
CHEST TIGHTNESS: 0

## 2025-03-17 ASSESSMENT — LIFESTYLE VARIABLES
HOW MANY STANDARD DRINKS CONTAINING ALCOHOL DO YOU HAVE ON A TYPICAL DAY: 1 OR 2
HOW OFTEN DO YOU HAVE A DRINK CONTAINING ALCOHOL: MONTHLY OR LESS

## 2025-03-17 NOTE — PROGRESS NOTES
[facility]  [facility] MEDICINE  [address]  [place] [address]  Dept: [phone]     Date of Visit:  3/17/2025  Patient Name: Donnie Vicente   Patient :  1950     CHIEF COMPLAINT/HPI:     Donnie Vicente is a 74 y.o. male who presents today for an general visit to be evaluated for the following condition(s):  Chief Complaint   Patient presents with    Medicare AWV     Patient is here for a medicare wellness and a 6 month follow up    Patient generlaly feeling OK.  Having lots of polyarthritis pain.  Norco seems to help.  He is having some memory issues as well.  No CP/SOB.    REVIEW OF SYSTEM      Review of Systems   Respiratory:  Negative for chest tightness and shortness of breath.    Cardiovascular:  Negative for chest pain.         REVIEWED INFORMATION      Allergies   Allergen Reactions    Pcn [Penicillins]      Edema         Current Outpatient Medications   Medication Sig Dispense Refill    HYDROcodone-acetaminophen (NORCO) 5-325 MG per tablet Take 1 tablet by mouth every 6 hours as needed for Pain for up to 30 days. Max Daily Amount: 4 tablets 80 tablet 0    donepezil (ARICEPT) 5 MG tablet Take 1 tablet by mouth nightly 30 tablet 3    lisinopril (PRINIVIL;ZESTRIL) 10 MG tablet TAKE 1 TABLET BY MOUTH DAILY 100 tablet 3    LORazepam (ATIVAN) 1 MG tablet TAKE 1 TABLET BY MOUTH DAILY. MAX DAILY AMOUNT: 1 MG 90 tablet 0    ezetimibe-simvastatin (VYTORIN) 10-20 MG per tablet Take 1 tablet by mouth nightly 100 tablet 2    allopurinol (ZYLOPRIM) 100 MG tablet TAKE 1 TABLET BY MOUTH ONCE  DAILY 100 tablet 2    SYMBICORT 80-4.5 MCG/ACT AERO USE 2 INHALATIONS BY MOUTH  TWICE DAILY 30.6 g 3    tamsulosin (FLOMAX) 0.4 MG capsule Take 1 capsule by mouth daily 100 capsule 3    finasteride (PROSCAR) 5 MG tablet Take 1 tablet by mouth daily 100 tablet 3    furosemide (LASIX) 20 MG tablet Take 1 tablet by mouth daily 100 tablet 2    metoprolol succinate (TOPROL

## 2025-03-17 NOTE — PATIENT INSTRUCTIONS
healthy for you, and not smoking or using tobacco. It also includes taking medicines as directed, managing other health conditions, and trying to get a healthy amount of sleep.  Follow-up care is a key part of your treatment and safety. Be sure to make and go to all appointments, and call your doctor if you are having problems. It's also a good idea to know your test results and keep a list of the medicines you take.  How can you care for yourself at home?  Diet    Use less salt when you cook and eat. This helps lower your blood pressure. Taste food before salting. Add only a little salt when you think you need it. With time, your taste buds will adjust to less salt.     Eat fewer snack items, fast foods, canned soups, and other high-salt, high-fat, processed foods.     Read food labels and try to avoid saturated and trans fats. They increase your risk of heart disease by raising cholesterol levels.     Limit the amount of solid fat--butter, margarine, and shortening--you eat. Use olive, peanut, or canola oil when you cook. Bake, broil, and steam foods instead of frying them.     Eat a variety of fruit and vegetables every day. Dark green, deep orange, red, or yellow fruits and vegetables are especially good for you. Examples include spinach, carrots, peaches, and berries.     Foods high in fiber can reduce your cholesterol and provide important vitamins and minerals. High-fiber foods include whole-grain cereals and breads, oatmeal, beans, brown rice, citrus fruits, and apples.     Eat lean proteins. Heart-healthy proteins include seafood, lean meats and poultry, eggs, beans, peas, nuts, seeds, and soy products.     Limit drinks and foods with added sugar. These include candy, desserts, and soda pop.   Heart-healthy lifestyle    If your doctor recommends it, get more exercise. For many people, walking is a good choice. Or you may want to swim, bike, or do other activities. Bit by bit, increase the time you're active

## 2025-04-08 RX ORDER — OMEPRAZOLE 20 MG/1
20 CAPSULE, DELAYED RELEASE ORAL DAILY
Qty: 100 CAPSULE | Refills: 2 | Status: SHIPPED | OUTPATIENT
Start: 2025-04-08

## 2025-04-08 NOTE — TELEPHONE ENCOUNTER
Donnie Vicente is calling to request a refill on the following medication(s):    Medication Request:  Requested Prescriptions     Pending Prescriptions Disp Refills    omeprazole (PRILOSEC) 20 MG delayed release capsule [Pharmacy Med Name: Omeprazole 20 MG Oral Capsule Delayed Release] 100 capsule 2     Sig: TAKE 1 CAPSULE BY MOUTH DAILY       Last Visit Date (If Applicable):  3/17/2025    Next Visit Date:    6/17/2025

## 2025-06-09 DIAGNOSIS — F41.9 ANXIETY: Primary | ICD-10-CM

## 2025-06-09 RX ORDER — LORAZEPAM 1 MG/1
1 TABLET ORAL EVERY 8 HOURS PRN
Qty: 90 TABLET | Refills: 0 | Status: SHIPPED | OUTPATIENT
Start: 2025-06-09 | End: 2025-09-07

## 2025-06-09 NOTE — TELEPHONE ENCOUNTER
Donnie Vicente is calling to request a refill on the following medication(s):    Medication Request:  Requested Prescriptions     Pending Prescriptions Disp Refills    LORazepam (ATIVAN) 1 MG tablet 90 tablet 0     Sig: Take 1 tablet by mouth every 8 hours as needed for Anxiety for up to 90 days. Max Daily Amount: 3 mg       Last Visit Date (If Applicable):  3/17/2025    Next Visit Date:    6/17/2025

## 2025-06-12 ENCOUNTER — HOSPITAL ENCOUNTER (OUTPATIENT)
Age: 75
Discharge: HOME | End: 2025-06-12
Payer: MEDICARE

## 2025-06-12 DIAGNOSIS — I71.9 AORTIC ANEURYSM WITHOUT RUPTURE, UNSPECIFIED PORTION OF AORTA: ICD-10-CM

## 2025-06-12 DIAGNOSIS — I10 ESSENTIAL HYPERTENSION, BENIGN: ICD-10-CM

## 2025-06-12 DIAGNOSIS — D64.9 ANEMIA, UNSPECIFIED TYPE: ICD-10-CM

## 2025-06-12 DIAGNOSIS — M51.362 DEGENERATION OF INTERVERTEBRAL DISC OF LUMBAR REGION WITH DISCOGENIC BACK PAIN AND LOWER EXTREMITY PAIN: ICD-10-CM

## 2025-06-12 DIAGNOSIS — I38 HEART VALVE DISEASE: ICD-10-CM

## 2025-06-12 LAB
ALBUMIN SERPL-MCNC: 4.3 G/DL (ref 3.5–5.2)
ALBUMIN/GLOB SERPL: 1.4 {RATIO} (ref 1–2.5)
ALP SERPL-CCNC: 93 U/L (ref 40–129)
ALT SERPL-CCNC: 28 U/L (ref 10–50)
ANION GAP SERPL CALCULATED.3IONS-SCNC: 11 MMOL/L (ref 9–16)
AST SERPL-CCNC: 27 U/L (ref 10–50)
BASOPHILS # BLD: 0.09 K/UL (ref 0–0.2)
BASOPHILS NFR BLD: 1 % (ref 0–2)
BILIRUB SERPL-MCNC: 0.6 MG/DL (ref 0–1.2)
BUN SERPL-MCNC: 30 MG/DL (ref 8–23)
CALCIUM SERPL-MCNC: 10.2 MG/DL (ref 8.6–10.4)
CHLORIDE SERPL-SCNC: 99 MMOL/L (ref 98–107)
CO2 SERPL-SCNC: 26 MMOL/L (ref 20–31)
CREAT SERPL-MCNC: 1.7 MG/DL (ref 0.7–1.2)
EOSINOPHIL # BLD: 0.19 K/UL (ref 0–0.44)
EOSINOPHILS RELATIVE PERCENT: 2 % (ref 1–4)
ERYTHROCYTE [DISTWIDTH] IN BLOOD BY AUTOMATED COUNT: 13.2 % (ref 11.8–14.4)
GFR, ESTIMATED: 42 ML/MIN/1.73M2
GLUCOSE SERPL-MCNC: 78 MG/DL (ref 74–99)
HCT VFR BLD AUTO: 43 % (ref 40.7–50.3)
HGB BLD-MCNC: 13.7 G/DL (ref 13–17)
IMM GRANULOCYTES # BLD AUTO: <0.03 K/UL (ref 0–0.3)
IMM GRANULOCYTES NFR BLD: 0 %
INR PPP: 2.1 (ref 0.9–1.2)
IRON SATN MFR SERPL: 12 % (ref 20–55)
IRON SERPL-MCNC: 32 UG/DL (ref 61–157)
LYMPHOCYTES NFR BLD: 1.44 K/UL (ref 1.1–3.7)
LYMPHOCYTES RELATIVE PERCENT: 14 % (ref 24–43)
MCH RBC QN AUTO: 29 PG (ref 25.2–33.5)
MCHC RBC AUTO-ENTMCNC: 31.9 G/DL (ref 28.4–34.8)
MCV RBC AUTO: 91.1 FL (ref 82.6–102.9)
MONOCYTES NFR BLD: 0.87 K/UL (ref 0.1–1.2)
MONOCYTES NFR BLD: 8 % (ref 3–12)
NEUTROPHILS NFR BLD: 75 % (ref 36–65)
NEUTS SEG NFR BLD: 8.09 K/UL (ref 1.5–8.1)
NRBC BLD-RTO: 0 PER 100 WBC
PLATELET # BLD AUTO: 353 K/UL (ref 138–453)
PMV BLD AUTO: 9.6 FL (ref 8.1–13.5)
POTASSIUM SERPL-SCNC: 5.6 MMOL/L (ref 3.7–5.3)
PROT SERPL-MCNC: 7.3 G/DL (ref 6.6–8.7)
PROTHROMBIN TIME: 24.9 SEC (ref 11.8–14.6)
RBC # BLD AUTO: 4.72 M/UL (ref 4.21–5.77)
SODIUM SERPL-SCNC: 136 MMOL/L (ref 136–145)
TIBC SERPL-MCNC: 274 UG/DL (ref 250–450)
UNSATURATED IRON BINDING CAPACITY: 242 UG/DL (ref 112–347)
VIT B12 SERPL-MCNC: 1147 PG/ML (ref 232–1245)
WBC OTHER # BLD: 10.7 K/UL (ref 3.5–11.3)

## 2025-06-12 PROCEDURE — 85025 COMPLETE CBC W/AUTO DIFF WBC: CPT

## 2025-06-12 PROCEDURE — 83550 IRON BINDING TEST: CPT

## 2025-06-12 PROCEDURE — 83540 ASSAY OF IRON: CPT

## 2025-06-12 PROCEDURE — 85610 PROTHROMBIN TIME: CPT

## 2025-06-12 PROCEDURE — 82607 VITAMIN B-12: CPT

## 2025-06-12 PROCEDURE — 80053 COMPREHEN METABOLIC PANEL: CPT

## 2025-06-12 PROCEDURE — 36415 COLL VENOUS BLD VENIPUNCTURE: CPT

## 2025-06-15 DIAGNOSIS — F41.9 ANXIETY: ICD-10-CM

## 2025-06-16 RX ORDER — LORAZEPAM 1 MG/1
TABLET ORAL
Qty: 90 TABLET | OUTPATIENT
Start: 2025-06-16

## 2025-06-16 NOTE — PATIENT INSTRUCTIONS
Donnie    Thank you for choosing [facility].  We know you have options when it comes to your healthcare; we appreciate that you chose us. Our goal is to provide exceptional  service and world class care to every patient.  You will be receiving a survey via email or text message asking for your feedback.  Please take a few minutes to share your thoughts about your recent visit. Your comments help us understand what we do well and ways we can improve.  Thank you in advance for your valuable feedback.      Dr. SARAH Bentley

## 2025-06-17 ENCOUNTER — OFFICE VISIT (OUTPATIENT)
Age: 75
End: 2025-06-17

## 2025-06-17 VITALS
HEIGHT: 70 IN | HEART RATE: 80 BPM | OXYGEN SATURATION: 95 % | WEIGHT: 198.6 LBS | TEMPERATURE: 97.7 F | SYSTOLIC BLOOD PRESSURE: 122 MMHG | DIASTOLIC BLOOD PRESSURE: 82 MMHG | BODY MASS INDEX: 28.43 KG/M2

## 2025-06-17 DIAGNOSIS — Z95.2 HISTORY OF ARTIFICIAL HEART VALVE: ICD-10-CM

## 2025-06-17 DIAGNOSIS — Z12.11 SCREENING FOR COLON CANCER: Primary | ICD-10-CM

## 2025-06-17 DIAGNOSIS — M51.362 DEGENERATION OF INTERVERTEBRAL DISC OF LUMBAR REGION WITH DISCOGENIC BACK PAIN AND LOWER EXTREMITY PAIN: ICD-10-CM

## 2025-06-17 DIAGNOSIS — N18.32 STAGE 3B CHRONIC KIDNEY DISEASE (HCC): ICD-10-CM

## 2025-06-17 DIAGNOSIS — I71.9 AORTIC ANEURYSM WITHOUT RUPTURE, UNSPECIFIED PORTION OF AORTA: ICD-10-CM

## 2025-06-17 SDOH — ECONOMIC STABILITY: FOOD INSECURITY: WITHIN THE PAST 12 MONTHS, YOU WORRIED THAT YOUR FOOD WOULD RUN OUT BEFORE YOU GOT MONEY TO BUY MORE.: NEVER TRUE

## 2025-06-17 SDOH — ECONOMIC STABILITY: FOOD INSECURITY: WITHIN THE PAST 12 MONTHS, THE FOOD YOU BOUGHT JUST DIDN'T LAST AND YOU DIDN'T HAVE MONEY TO GET MORE.: NEVER TRUE

## 2025-06-17 ASSESSMENT — ENCOUNTER SYMPTOMS
CHEST TIGHTNESS: 0
SHORTNESS OF BREATH: 0

## 2025-06-17 ASSESSMENT — PATIENT HEALTH QUESTIONNAIRE - PHQ9
1. LITTLE INTEREST OR PLEASURE IN DOING THINGS: NOT AT ALL
2. FEELING DOWN, DEPRESSED OR HOPELESS: NOT AT ALL
SUM OF ALL RESPONSES TO PHQ QUESTIONS 1-9: 0

## 2025-06-17 NOTE — PROGRESS NOTES
[facility]  [facility] MEDICINE  [address]  [facility] [address]  Dept: [phone]     Date of Visit:  2025  Patient Name: Donnie Vicente   Patient :  1950     CHIEF COMPLAINT/HPI:     Donnie Vicente is a 74 y.o. male who presents today for an general visit to be evaluated for the following condition(s):  Chief Complaint   Patient presents with    Blood Work     3 month check up with labs done on 2025.      Patient stopped taking donepezil due to fecal incontinence.   Patient does self test his INR.  He denies CP/SOB.  Remains on percocet for chronic lumbar DDD.  REVIEW OF SYSTEM      Review of Systems   Respiratory:  Negative for chest tightness and shortness of breath.    Cardiovascular:  Negative for chest pain.         REVIEWED INFORMATION      Allergies   Allergen Reactions    Pcn [Penicillins]      Edema         Current Outpatient Medications   Medication Sig Dispense Refill    LORazepam (ATIVAN) 1 MG tablet Take 1 tablet by mouth every 8 hours as needed for Anxiety for up to 90 days. Max Daily Amount: 3 mg 90 tablet 0    furosemide (LASIX) 20 MG tablet TAKE 1 TABLET BY MOUTH DAILY 100 tablet 2    omeprazole (PRILOSEC) 20 MG delayed release capsule TAKE 1 CAPSULE BY MOUTH DAILY 100 capsule 2    lisinopril (PRINIVIL;ZESTRIL) 10 MG tablet TAKE 1 TABLET BY MOUTH DAILY 100 tablet 3    ezetimibe-simvastatin (VYTORIN) 10-20 MG per tablet Take 1 tablet by mouth nightly 100 tablet 2    allopurinol (ZYLOPRIM) 100 MG tablet TAKE 1 TABLET BY MOUTH ONCE  DAILY 100 tablet 2    SYMBICORT 80-4.5 MCG/ACT AERO USE 2 INHALATIONS BY MOUTH  TWICE DAILY 30.6 g 3    tamsulosin (FLOMAX) 0.4 MG capsule Take 1 capsule by mouth daily 100 capsule 3    finasteride (PROSCAR) 5 MG tablet Take 1 tablet by mouth daily 100 tablet 3    metoprolol succinate (TOPROL XL) 50 MG extended release tablet Take 1 tablet by mouth 2 times daily (Patient taking differently: Take 1

## 2025-07-01 LAB — NONINV COLON CA DNA+OCC BLD SCRN STL QL: NEGATIVE

## 2025-07-18 ENCOUNTER — HOSPITAL ENCOUNTER (OUTPATIENT)
Age: 75
Setting detail: SPECIMEN
Discharge: HOME | End: 2025-07-18

## 2025-07-18 DIAGNOSIS — N28.1 ACQUIRED CYST OF KIDNEY: ICD-10-CM

## 2025-07-18 DIAGNOSIS — N18.32 STAGE 3B CHRONIC KIDNEY DISEASE (HCC): ICD-10-CM

## 2025-07-18 DIAGNOSIS — I10 ESSENTIAL HYPERTENSION, BENIGN: ICD-10-CM

## 2025-07-18 LAB
25(OH)D3 SERPL-MCNC: 73.8 NG/ML (ref 30–100)
ANION GAP SERPL CALCULATED.3IONS-SCNC: 10 MMOL/L (ref 9–16)
BASOPHILS # BLD: 0.07 K/UL (ref 0–0.2)
BASOPHILS NFR BLD: 1 % (ref 0–2)
BUN SERPL-MCNC: 30 MG/DL (ref 8–23)
CALCIUM SERPL-MCNC: 10.2 MG/DL (ref 8.6–10.4)
CHLORIDE SERPL-SCNC: 99 MMOL/L (ref 98–107)
CO2 SERPL-SCNC: 28 MMOL/L (ref 20–31)
CREAT SERPL-MCNC: 1.9 MG/DL (ref 0.7–1.2)
CREAT UR-MCNC: 59.9 MG/DL (ref 39–259)
EOSINOPHIL # BLD: 0.28 K/UL (ref 0–0.44)
EOSINOPHILS RELATIVE PERCENT: 4 % (ref 1–4)
ERYTHROCYTE [DISTWIDTH] IN BLOOD BY AUTOMATED COUNT: 13.3 % (ref 11.8–14.4)
GFR, ESTIMATED: 37 ML/MIN/1.73M2
GLUCOSE SERPL-MCNC: 83 MG/DL (ref 74–99)
HCT VFR BLD AUTO: 40.8 % (ref 40.7–50.3)
HGB BLD-MCNC: 13.3 G/DL (ref 13–17)
IMM GRANULOCYTES # BLD AUTO: <0.03 K/UL (ref 0–0.3)
IMM GRANULOCYTES NFR BLD: 0 %
LYMPHOCYTES NFR BLD: 2.37 K/UL (ref 1.1–3.7)
LYMPHOCYTES RELATIVE PERCENT: 30 % (ref 24–43)
MAGNESIUM SERPL-MCNC: 2 MG/DL (ref 1.6–2.4)
MCH RBC QN AUTO: 29.1 PG (ref 25.2–33.5)
MCHC RBC AUTO-ENTMCNC: 32.6 G/DL (ref 28.4–34.8)
MCV RBC AUTO: 89.3 FL (ref 82.6–102.9)
MONOCYTES NFR BLD: 0.76 K/UL (ref 0.1–1.2)
MONOCYTES NFR BLD: 10 % (ref 3–12)
NEUTROPHILS NFR BLD: 55 % (ref 36–65)
NEUTS SEG NFR BLD: 4.41 K/UL (ref 1.5–8.1)
NRBC BLD-RTO: 0 PER 100 WBC
PHOSPHATE SERPL-MCNC: 4 MG/DL (ref 2.5–4.5)
PLATELET # BLD AUTO: 321 K/UL (ref 138–453)
PMV BLD AUTO: 9.6 FL (ref 8.1–13.5)
POTASSIUM SERPL-SCNC: 4.5 MMOL/L (ref 3.7–5.3)
PTH-INTACT SERPL-MCNC: 40 PG/ML (ref 17.9–58.6)
RBC # BLD AUTO: 4.57 M/UL (ref 4.21–5.77)
SODIUM SERPL-SCNC: 137 MMOL/L (ref 136–145)
TOTAL PROTEIN, URINE: <4 MG/DL
WBC OTHER # BLD: 7.9 K/UL (ref 3.5–11.3)

## 2025-07-31 DIAGNOSIS — F41.9 ANXIETY: ICD-10-CM

## 2025-08-01 RX ORDER — LORAZEPAM 1 MG/1
TABLET ORAL
Qty: 90 TABLET | Refills: 0 | Status: SHIPPED | OUTPATIENT
Start: 2025-08-01 | End: 2025-10-30

## 2025-08-01 NOTE — TELEPHONE ENCOUNTER
Donnie Vicente is calling to request a refill on the following medication(s):    Medication Request:  Requested Prescriptions     Pending Prescriptions Disp Refills    LORazepam (ATIVAN) 1 MG tablet [Pharmacy Med Name: LORAZEPAM 1MG TABLETS] 90 tablet      Sig: TAKE 1 TABLET BY MOUTH EVERY NIGHT. MAX DAILY AMOUNT: 1 MG       Last Visit Date (If Applicable):  6/17/2025    Next Visit Date:    10/17/2025